# Patient Record
Sex: FEMALE | Race: WHITE | NOT HISPANIC OR LATINO | Employment: FULL TIME | URBAN - METROPOLITAN AREA
[De-identification: names, ages, dates, MRNs, and addresses within clinical notes are randomized per-mention and may not be internally consistent; named-entity substitution may affect disease eponyms.]

---

## 2017-01-11 ENCOUNTER — OFFICE VISIT (OUTPATIENT)
Dept: INTERNAL MEDICINE | Facility: CLINIC | Age: 32
End: 2017-01-11

## 2017-01-11 VITALS
HEIGHT: 68 IN | BODY MASS INDEX: 20.69 KG/M2 | HEART RATE: 92 BPM | SYSTOLIC BLOOD PRESSURE: 102 MMHG | TEMPERATURE: 98.6 F | WEIGHT: 136.5 LBS | OXYGEN SATURATION: 96 % | DIASTOLIC BLOOD PRESSURE: 52 MMHG

## 2017-01-11 DIAGNOSIS — J06.9 ACUTE URI: Primary | ICD-10-CM

## 2017-01-11 PROCEDURE — 99213 OFFICE O/P EST LOW 20 MIN: CPT | Performed by: FAMILY MEDICINE

## 2017-01-11 NOTE — PROGRESS NOTES
Subjective   Niki Nuñez is a 31 y.o. female.     Chief Complaint   Patient presents with   • patient had cold for couple day     getting over it, still feel aches   • Muscular Dystrophy       patient was diagnosed at [facility]          History of Present Illness patient has symptoms of upper respiratory infection with sore throat and head congestion scratchy throat which is gradually improving symptomatically treatment no specific fever  She also has been diagnosed with classic myotonic dystrophy type I and we discussed diagnoses previous symptoms and futureelevations of symptoms that may develop    The following portions of the patient's history were reviewed and updated as appropriate: allergies, current medications, past family history, past medical history, past social history, past surgical history and problem list.    Review of Systems   Constitutional: Negative.    Eyes: Negative.    Respiratory: Negative.    Gastrointestinal: Negative.    Neurological: Negative.    Psychiatric/Behavioral: Negative.        Objective   Physical Exam   Constitutional: She is oriented to person, place, and time. She appears well-developed and well-nourished.   Neurological: She is alert and oriented to person, place, and time.   Psychiatric: She has a normal mood and affect. Her behavior is normal. Judgment and thought content normal.   Nursing note and vitals reviewed.      Assessment/Plan   Niki was seen today for patient had cold for couple day and muscular dystrophy .    Diagnoses and all orders for this visit:    Acute URI    the extensive discussion regarding muscular dystrophy  Is going to follow-up with [facility]  Symptomatic treatment for URI follow-up as needed  47

## 2017-02-06 NOTE — MR AVS SNAPSHOT
Niki Nuñez   2017 10:30 AM   Office Visit    Dept Phone:  [phone]   Encounter #:  [id number]    Provider:  Andrey Mix MD   Department:  [facility] MED                Your Full Care Plan              Your Updated Medication List          This list is accurate as of: 17 12:53 PM.  Always use your most recent med list.                ADVIL 200 MG capsule   Generic drug:  Ibuprofen       CAMRESE LO 0.1-0.02 & 0.01 MG tablet   Generic drug:  Levonorgest-Eth Estrad 91-Day               Instructions     None    Patient Instructions History      Upcoming Appointments     Visit Type Date Time Department    ACUTE           2017 10:30 AM [facility] [place]      W[employer] Signup     [facility] [employer] allows you to send messages to your doctor, view your test results, renew your prescriptions, schedule appointments, and more. To sign up, go to MiniTime and click on the Sign Up Now link in the New User? box. Enter your [employer] Activation Code exactly as it appears below along with the last four digits of your Social Security Number and your Date of Birth () to complete the sign-up process. If you do not sign up before the expiration date, you must request a new code.    W[employer] Activation Code: [email or web address]  Expires: 2017 12:53 PM    If you have questions, you can email [email or web address] or call [phone] to talk to our [employer] staff. Remember, Wish [employer] Hero is NOT to be used for urgent needs. For medical emergencies, dial 911.               Other Info from Your Visit           Allergies     No Known Allergies      Reason for Visit     patient had cold for couple day getting over it, still feel aches    Muscular Dystrophy   patient was diagnosed at [facility]       Vital Signs     Blood Pressure Pulse Temperature Height Weight Oxygen Saturation    102/52 (BP Location: Right arm, Patient  Patient was last seen 7/14/16, no upcoming appt.  Script was eprescribed to pharmacy per Dr. Gutierrez for 5 months.        "Position: Sitting, Cuff Size: Adult) 92 98.6 °F (37 °C) (Oral) 68\" (172.7 cm) 136 lb 8 oz (61.9 kg) 96%    Breastfeeding? Body Mass Index Smoking Status             No 20.75 kg/m2 Never Smoker         Problems and Diagnoses Noted     Classic myotonic dystrophy type 1        "

## 2017-02-27 ENCOUNTER — TELEPHONE (OUTPATIENT)
Dept: INTERNAL MEDICINE | Facility: CLINIC | Age: 32
End: 2017-02-27

## 2017-02-27 DIAGNOSIS — G71.11 MYOTONIC DYSTROPHY, TYPE 1 (HCC): Primary | ICD-10-CM

## 2017-02-27 NOTE — TELEPHONE ENCOUNTER
Patient called stating that she needs a referral to Dr. Jose Eduardo Tamayo at [facility] for Myotonic Dystrophy Type 1.  Referral put in [facility].

## 2017-06-06 ENCOUNTER — OFFICE VISIT (OUTPATIENT)
Dept: CARDIOLOGY | Facility: CLINIC | Age: 32
End: 2017-06-06

## 2017-06-06 ENCOUNTER — TELEPHONE (OUTPATIENT)
Dept: CARDIOLOGY | Facility: CLINIC | Age: 32
End: 2017-06-06

## 2017-06-06 VITALS
SYSTOLIC BLOOD PRESSURE: 120 MMHG | HEART RATE: 78 BPM | DIASTOLIC BLOOD PRESSURE: 62 MMHG | WEIGHT: 141.6 LBS | HEIGHT: 68 IN | BODY MASS INDEX: 21.46 KG/M2

## 2017-06-06 DIAGNOSIS — G71.11: Primary | ICD-10-CM

## 2017-06-06 PROCEDURE — 93000 ELECTROCARDIOGRAM COMPLETE: CPT | Performed by: INTERNAL MEDICINE

## 2017-06-06 PROCEDURE — 99204 OFFICE O/P NEW MOD 45 MIN: CPT | Performed by: INTERNAL MEDICINE

## 2017-06-06 NOTE — TELEPHONE ENCOUNTER
Patient calling back with more information regarding her dad and his pacemaker.    Please call her at [phone].    Thanks!

## 2017-06-06 NOTE — PROGRESS NOTES
Date of Office Visit: 2017  Encounter Provider: Timothy Anderson MD  Place of Service: [facility] CARDIOLOGY  Patient Name: Niki Nuñez  :1985  [id number]    Chief Complaint   Patient presents with   • Shortness of Breath   • myotonic dystrophy   :     HPI: Niki Nuñez is a 31 y.o. female   She is here to evaluate her myotonic dystrophy type 1.  She was just diagnosed with this.  She has had it probably for a number of years and did not really realize it.  She just underwent genetic testing.  Her dad was diagnosed with it and she and several of her siblings have it.  He has a pacer defibrillator in but has never been shocked.  He has had atrial fibrillation.  She feels fine.  This is manifested mainly by  in her hands and a little bit of problem in her left foot if she gets on her toes.  She has not had syncope and no chest pain, shortness of breath, paroxysmal nocturnal dyspnea, orthopnea, or edema.  She may not have the energy that she used to have.  She does not smoke or have diabetes or hypertension.  She does not have any other real medical problems.  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          Past Medical History:   Diagnosis Date   • Cataracts, bilateral    • Classic myotonic dystrophy type 1 2016    [facility] Testing   • POLANCO (dyspnea on exertion)    • Gastrointestinal dysfunction    • Muscular dystrophy    • URI (upper respiratory infection)        Past Surgical History:   Procedure Laterality Date   • CATARACT EXTRACTION, BILATERAL     • CHOLECYSTECTOMY  2007   • LASIK Bilateral    • TEMPOROMANDIBULAR JOINT ARTHROPLASTY         Social History     Social History   • Marital status:      Spouse name: N/A   • Number of children: 0   • Years of education: N/A     Occupational History   • RN      Social History Main Topics   • Smoking status: Never Smoker   • Smokeless tobacco: Never Used   • Alcohol use Yes       Comment: 2-4 per week   • Drug use: Not on file   • Sexual activity: Yes     Partners: Male     Other Topics Concern   • Not on file     Social History Narrative       Family History   Problem Relation Age of Onset   • Nephrolithiasis Mother    • Glaucoma Father    • Autism Brother    • Breast cancer Maternal Grandmother    • Rheum arthritis Maternal Grandmother    • Hypertension Maternal Grandfather    • Diabetes Maternal Grandfather    • Macular degeneration Maternal Grandfather    • Heart attack Paternal Grandfather    • Heart disease Paternal Grandfather    • Glaucoma Paternal Grandfather    • Breast cancer Other    • Brain cancer Other    • Cancer Other    • Cancer Cousin    • Alcohol abuse Paternal Uncle    • Sleep apnea Other    • Other Other      myotonic dystrophy type 1       Review of Systems   Constitution: Negative for decreased appetite, fever, malaise/fatigue and weight loss.   HENT: Negative for nosebleeds.    Eyes: Negative for double vision.   Cardiovascular: Negative for chest pain, claudication, cyanosis, dyspnea on exertion, irregular heartbeat, leg swelling, near-syncope, orthopnea, palpitations, paroxysmal nocturnal dyspnea and syncope.   Respiratory: Negative for cough, hemoptysis and shortness of breath.    Hematologic/Lymphatic: Negative for bleeding problem.   Skin: Negative for rash.   Musculoskeletal: Negative for falls and myalgias.   Gastrointestinal: Negative for hematochezia, jaundice, melena, nausea and vomiting.   Genitourinary: Negative for hematuria.   Neurological: Negative for dizziness and seizures.   Psychiatric/Behavioral: Negative for altered mental status and memory loss.       No Known Allergies      Current Outpatient Prescriptions:   •  CAMRESE LO 0.1-0.02 & 0.01 MG tablet, Take 1 tablet by mouth daily., Disp: , Rfl: 0  •  Ibuprofen (ADVIL) 200 MG capsule, Take 2 tablets by mouth daily as needed., Disp: , Rfl:      Objective:     Vitals:    06/06/17 1438   BP: 120/62  "  Pulse: 78   Weight: 141 lb 9.6 oz (64.2 kg)   Height: 68\" (172.7 cm)     Body mass index is 21.53 kg/(m^2).    Physical Exam   Constitutional: She is oriented to person, place, and time. She appears well-developed and well-nourished.   HENT:   Head: Normocephalic.   Eyes: No scleral icterus.   Neck: No JVD present. No thyromegaly present.   Cardiovascular: Normal rate, regular rhythm and normal heart sounds.  Exam reveals no gallop and no friction rub.    No murmur heard.  Pulmonary/Chest: Effort normal and breath sounds normal. She has no wheezes. She has no rales.   Abdominal: Soft. There is no hepatosplenomegaly. There is no tenderness.   Musculoskeletal: Normal range of motion. She exhibits no edema.   Lymphadenopathy:     She has no cervical adenopathy.   Neurological: She is alert and oriented to person, place, and time.   Skin: Skin is warm and dry. No rash noted.   Psychiatric: She has a normal mood and affect.         ECG 12 Lead  Date/Time: 6/6/2017 3:24 PM  Performed by: PANKAJ LORD  Authorized by: PANKAJ LORD   Comparison: compared with previous ECG   Similar to previous ECG  Rhythm: sinus rhythm  Clinical impression: normal ECG             Assessment:       Diagnosis Plan   1. Classic myotonic dystrophy type 1  Adult Transthoracic Echo Complete          Plan:        She, I think, is doing well right now.   She has myotonic dystrophy, type 1, which the main morbidity and mortality with it is cardiac involvement with the development of conduction system disease and/or cardiomyopathy.   Her EKG is normal. Her exam is normal. She does not have any symptoms.   I think we should get and echo as a baseline and then we may consider a Holter also on her at some point.  I think she is probably stable right now.   I am just going to have her come back in and see me in a  year, sooner if she has trouble.           As always, it has been a pleasure to participate in your patient's care.      Sincerely, "       Timothy Anderson MD

## 2017-06-15 ENCOUNTER — HOSPITAL ENCOUNTER (OUTPATIENT)
Dept: CARDIOLOGY | Facility: HOSPITAL | Age: 32
Discharge: HOME | End: 2017-06-15
Attending: INTERNAL MEDICINE | Admitting: INTERNAL MEDICINE

## 2017-06-15 VITALS
SYSTOLIC BLOOD PRESSURE: 106 MMHG | HEIGHT: 68 IN | HEART RATE: 80 BPM | BODY MASS INDEX: 21.37 KG/M2 | DIASTOLIC BLOOD PRESSURE: 60 MMHG | WEIGHT: 141 LBS

## 2017-06-15 DIAGNOSIS — G71.11: ICD-10-CM

## 2017-06-15 LAB
BH CV ECHO MEAS - ACS: 1.9 CM
BH CV ECHO MEAS - AO MAX PG (FULL): 2.6 MMHG
BH CV ECHO MEAS - AO MAX PG: 4.8 MMHG
BH CV ECHO MEAS - AO MEAN PG (FULL): 1.3 MMHG
BH CV ECHO MEAS - AO MEAN PG: 2.8 MMHG
BH CV ECHO MEAS - AO ROOT AREA (BSA CORRECTED): 1.4
BH CV ECHO MEAS - AO ROOT AREA: 4.7 CM^2
BH CV ECHO MEAS - AO ROOT DIAM: 2.4 CM
BH CV ECHO MEAS - AO V2 MAX: 109.3 CM/SEC
BH CV ECHO MEAS - AO V2 MEAN: 79 CM/SEC
BH CV ECHO MEAS - AO V2 VTI: 18.9 CM
BH CV ECHO MEAS - AVA(I,A): 1.6 CM^2
BH CV ECHO MEAS - AVA(I,D): 1.6 CM^2
BH CV ECHO MEAS - AVA(V,A): 1.5 CM^2
BH CV ECHO MEAS - AVA(V,D): 1.5 CM^2
BH CV ECHO MEAS - BSA(HAYCOCK): 1.8 M^2
BH CV ECHO MEAS - BSA: 1.8 M^2
BH CV ECHO MEAS - BZI_BMI: 21.4 KILOGRAMS/M^2
BH CV ECHO MEAS - BZI_METRIC_HEIGHT: 172.7 CM
BH CV ECHO MEAS - BZI_METRIC_WEIGHT: 64 KG
BH CV ECHO MEAS - CONTRAST EF (2CH): 68.2 ML/M^2
BH CV ECHO MEAS - CONTRAST EF 4CH: 63.8 ML/M^2
BH CV ECHO MEAS - EDV(CUBED): 63.7 ML
BH CV ECHO MEAS - EDV(MOD-SP2): 66 ML
BH CV ECHO MEAS - EDV(MOD-SP4): 69 ML
BH CV ECHO MEAS - EDV(TEICH): 69.8 ML
BH CV ECHO MEAS - EF(CUBED): 74 %
BH CV ECHO MEAS - EF(MOD-SP2): 68.2 %
BH CV ECHO MEAS - EF(MOD-SP4): 63.8 %
BH CV ECHO MEAS - EF(TEICH): 66.4 %
BH CV ECHO MEAS - ESV(CUBED): 16.6 ML
BH CV ECHO MEAS - ESV(MOD-SP2): 21 ML
BH CV ECHO MEAS - ESV(MOD-SP4): 25 ML
BH CV ECHO MEAS - ESV(TEICH): 23.5 ML
BH CV ECHO MEAS - FS: 36.2 %
BH CV ECHO MEAS - IVS/LVPW: 1.2
BH CV ECHO MEAS - IVSD: 0.89 CM
BH CV ECHO MEAS - LAT PEAK E' VEL: 6 CM/SEC
BH CV ECHO MEAS - LV DIASTOLIC VOL/BSA (35-75): 39.2 ML/M^2
BH CV ECHO MEAS - LV MASS(C)D: 95.5 GRAMS
BH CV ECHO MEAS - LV MASS(C)DI: 54.2 GRAMS/M^2
BH CV ECHO MEAS - LV MAX PG: 2.1 MMHG
BH CV ECHO MEAS - LV MEAN PG: 1.5 MMHG
BH CV ECHO MEAS - LV SYSTOLIC VOL/BSA (12-30): 14.2 ML/M^2
BH CV ECHO MEAS - LV V1 MAX: 73.3 CM/SEC
BH CV ECHO MEAS - LV V1 MEAN: 59.1 CM/SEC
BH CV ECHO MEAS - LV V1 VTI: 13.1 CM
BH CV ECHO MEAS - LVIDD: 4 CM
BH CV ECHO MEAS - LVIDS: 2.6 CM
BH CV ECHO MEAS - LVLD AP2: 8.3 CM
BH CV ECHO MEAS - LVLD AP4: 7.9 CM
BH CV ECHO MEAS - LVLS AP2: 7.2 CM
BH CV ECHO MEAS - LVLS AP4: 6.9 CM
BH CV ECHO MEAS - LVOT AREA (M): 2.3 CM^2
BH CV ECHO MEAS - LVOT AREA: 2.3 CM^2
BH CV ECHO MEAS - LVOT DIAM: 1.7 CM
BH CV ECHO MEAS - LVPWD: 0.74 CM
BH CV ECHO MEAS - MED PEAK E' VEL: 7 CM/SEC
BH CV ECHO MEAS - MV A DUR: 0.08 SEC
BH CV ECHO MEAS - MV A MAX VEL: 38.6 CM/SEC
BH CV ECHO MEAS - MV DEC SLOPE: 252.2 CM/SEC^2
BH CV ECHO MEAS - MV DEC TIME: 0.24 SEC
BH CV ECHO MEAS - MV E MAX VEL: 61.3 CM/SEC
BH CV ECHO MEAS - MV E/A: 1.6
BH CV ECHO MEAS - MV MAX PG: 5.6 MMHG
BH CV ECHO MEAS - MV MEAN PG: 2.3 MMHG
BH CV ECHO MEAS - MV P1/2T MAX VEL: 61.3 CM/SEC
BH CV ECHO MEAS - MV P1/2T: 71.2 MSEC
BH CV ECHO MEAS - MV V2 MAX: 118.8 CM/SEC
BH CV ECHO MEAS - MV V2 MEAN: 70 CM/SEC
BH CV ECHO MEAS - MV V2 VTI: 24.2 CM
BH CV ECHO MEAS - MVA P1/2T LCG: 3.6 CM^2
BH CV ECHO MEAS - MVA(P1/2T): 3.1 CM^2
BH CV ECHO MEAS - MVA(VTI): 1.3 CM^2
BH CV ECHO MEAS - PA ACC TIME: 0.12 SEC
BH CV ECHO MEAS - PA MAX PG (FULL): 1.9 MMHG
BH CV ECHO MEAS - PA MAX PG: 4.6 MMHG
BH CV ECHO MEAS - PA PR(ACCEL): 23.5 MMHG
BH CV ECHO MEAS - PA V2 MAX: 107.2 CM/SEC
BH CV ECHO MEAS - PULM A REVS DUR: 0.09 SEC
BH CV ECHO MEAS - PULM A REVS VEL: 20.1 CM/SEC
BH CV ECHO MEAS - PULM DIAS VEL: 49.7 CM/SEC
BH CV ECHO MEAS - PULM S/D: 0.84
BH CV ECHO MEAS - PULM SYS VEL: 41.6 CM/SEC
BH CV ECHO MEAS - PVA(V,A): 1.5 CM^2
BH CV ECHO MEAS - PVA(V,D): 1.5 CM^2
BH CV ECHO MEAS - QP/QS: 1.1
BH CV ECHO MEAS - RAP SYSTOLE: 3 MMHG
BH CV ECHO MEAS - RV MAX PG: 2.7 MMHG
BH CV ECHO MEAS - RV MEAN PG: 1.5 MMHG
BH CV ECHO MEAS - RV V1 MAX: 82.5 CM/SEC
BH CV ECHO MEAS - RV V1 MEAN: 58.3 CM/SEC
BH CV ECHO MEAS - RV V1 VTI: 16.2 CM
BH CV ECHO MEAS - RVOT AREA: 2 CM^2
BH CV ECHO MEAS - RVOT DIAM: 1.6 CM
BH CV ECHO MEAS - SI(AO): 50 ML/M^2
BH CV ECHO MEAS - SI(CUBED): 26.8 ML/M^2
BH CV ECHO MEAS - SI(LVOT): 17.2 ML/M^2
BH CV ECHO MEAS - SI(MOD-SP2): 25.5 ML/M^2
BH CV ECHO MEAS - SI(MOD-SP4): 25 ML/M^2
BH CV ECHO MEAS - SI(TEICH): 26.3 ML/M^2
BH CV ECHO MEAS - SUP REN AO DIAM: 1.4 CM
BH CV ECHO MEAS - SV(AO): 88 ML
BH CV ECHO MEAS - SV(CUBED): 47.2 ML
BH CV ECHO MEAS - SV(LVOT): 30.3 ML
BH CV ECHO MEAS - SV(MOD-SP2): 45 ML
BH CV ECHO MEAS - SV(MOD-SP4): 44 ML
BH CV ECHO MEAS - SV(RVOT): 32.5 ML
BH CV ECHO MEAS - SV(TEICH): 46.3 ML
BH CV ECHO MEAS - TAPSE (>1.6): 2.5 CM2
BH CV XLRA - RV BASE: 2.2 CM
BH CV XLRA - TDI S': 15 CM/SEC
E/E' RATIO: 6.5
LEFT ATRIUM VOLUME INDEX: 11 ML/M2

## 2017-06-15 PROCEDURE — 93306 TTE W/DOPPLER COMPLETE: CPT

## 2017-06-15 PROCEDURE — 93306 TTE W/DOPPLER COMPLETE: CPT | Performed by: INTERNAL MEDICINE

## 2017-06-16 ENCOUNTER — TELEPHONE (OUTPATIENT)
Dept: CARDIOLOGY | Facility: CLINIC | Age: 32
End: 2017-06-16

## 2017-06-16 NOTE — TELEPHONE ENCOUNTER
----- Message from Timothy Anderson MD sent at 6/16/2017  2:23 PM EDT -----  i left message it is nl

## 2018-02-01 ENCOUNTER — TELEPHONE (OUTPATIENT)
Dept: INTERNAL MEDICINE | Facility: CLINIC | Age: 33
End: 2018-02-01

## 2018-02-02 ENCOUNTER — OFFICE VISIT (OUTPATIENT)
Dept: INTERNAL MEDICINE | Facility: CLINIC | Age: 33
End: 2018-02-02

## 2018-02-02 VITALS
BODY MASS INDEX: 22.35 KG/M2 | WEIGHT: 147.5 LBS | TEMPERATURE: 96.8 F | SYSTOLIC BLOOD PRESSURE: 100 MMHG | DIASTOLIC BLOOD PRESSURE: 74 MMHG | HEIGHT: 68 IN | OXYGEN SATURATION: 98 % | HEART RATE: 96 BPM

## 2018-02-02 DIAGNOSIS — T82.898S: Primary | ICD-10-CM

## 2018-02-02 PROCEDURE — 99213 OFFICE O/P EST LOW 20 MIN: CPT | Performed by: FAMILY MEDICINE

## 2018-02-02 RX ORDER — DIPHENHYDRAMINE HCL 25 MG
25 CAPSULE ORAL NIGHTLY PRN
COMMUNITY

## 2018-02-02 RX ORDER — POLYETHYLENE GLYCOL-3350 AND ELECTROLYTES 236; 6.74; 5.86; 2.97; 22.74 G/274.31G; G/274.31G; G/274.31G; G/274.31G; G/274.31G
POWDER, FOR SOLUTION ORAL
Refills: 0 | COMMUNITY
Start: 2017-12-14 | End: 2018-02-02

## 2018-02-02 RX ORDER — NORGESTREL-ETHINYL ESTRADIOL 0.3-0.03MG
TABLET ORAL
Refills: 0 | COMMUNITY
Start: 2017-12-07

## 2018-02-14 PROBLEM — T82.898A: Status: ACTIVE | Noted: 2018-02-14

## 2018-02-14 NOTE — PROGRESS NOTES
"Niki Nuñez is a 32 y.o. female.      Assessment/Plan   Problem List Items Addressed This Visit        Other    Hematoma of IV site - Primary           Jarrod for gradual slow improvement no specific treatment at this time      Chief Complaint   Patient presents with   • had an IV in left arm and now having a hard spot and painful     done on 1/18/18     Social History   Substance Use Topics   • Smoking status: Never Smoker   • Smokeless tobacco: Never Used   • Alcohol use Yes      Comment: 2-4 per week       History of Present Illness     The following pPatient had IV in her arm now subsequently has developed a firm nodule that is not specifically tender or red ortions of the patient's history were reviewed and updated as appropriate:PMHroutine: Social history , Allergies, Current Medications, Active Problem List, Family History and Health Maintenance    Review of Systems   Constitutional: Negative.    HENT: Negative.    Hematological: Negative.        Objective   Vitals:    02/02/18 1043   BP: 100/74   BP Location: Right arm   Patient Position: Sitting   Cuff Size: Adult   Pulse: 96   Temp: 96.8 °F (36 °C)   TempSrc: Oral   SpO2: 98%   Weight: 66.9 kg (147 lb 8 oz)   Height: 172.7 cm (68\")     Body mass index is 22.43 kg/(m^2).  Physical Exam   Constitutional: She appears well-developed and well-nourished.   HENT:   Head: Normocephalic and atraumatic.   Cardiovascular: Normal rate and regular rhythm.    Psychiatric: She has a normal mood and affect. Her behavior is normal. Judgment and thought content normal.   Nursing note and vitals reviewed.   left arm nodule extending the antecubital area no erythema and no specific tenderness not still compromise of cardiovascular supply proximally no lymph nodes  Reviewed Data:  No visits with results within 1 Month(s) from this visit.  Latest known visit with results is:    Hospital Outpatient Visit on 06/15/2017   Component Date Value Ref Range Status   • BSA " 06/15/2017 1.8  m^2 Preliminary   • IVSd 06/15/2017 0.89  cm Preliminary   • LVIDd 06/15/2017 4.0  cm Preliminary   • LVIDs 06/15/2017 2.6  cm Preliminary   • LVPWd 06/15/2017 0.74  cm Preliminary   • IVS/LVPW 06/15/2017 1.2   Preliminary   • FS 06/15/2017 36.2  % Preliminary   • EDV(Teich) 06/15/2017 69.8  ml Preliminary   • ESV(Teich) 06/15/2017 23.5  ml Preliminary   • EF(Teich) 06/15/2017 66.4  % Preliminary   • EDV(cubed) 06/15/2017 63.7  ml Preliminary   • ESV(cubed) 06/15/2017 16.6  ml Preliminary   • EF(cubed) 06/15/2017 74.0  % Preliminary   • LV mass(C)d 06/15/2017 95.5  grams Preliminary   • LV mass(C)dI 06/15/2017 54.2  grams/m^2 Preliminary   • SV(Teich) 06/15/2017 46.3  ml Preliminary   • SI(Teich) 06/15/2017 26.3  ml/m^2 Preliminary   • SV(cubed) 06/15/2017 47.2  ml Preliminary   • SI(cubed) 06/15/2017 26.8  ml/m^2 Preliminary   • Ao root diam 06/15/2017 2.4  cm Preliminary   • Ao root area 06/15/2017 4.7  cm^2 Preliminary   • ACS 06/15/2017 1.9  cm Preliminary   • LVOT diam 06/15/2017 1.7  cm Preliminary   • LVOT area 06/15/2017 2.3  cm^2 Preliminary   • LVOT area(traced) 06/15/2017 2.3  cm^2 Preliminary   • RVOT diam 06/15/2017 1.6  cm Preliminary   • RVOT area 06/15/2017 2.0  cm^2 Preliminary   • LVLd ap4 06/15/2017 7.9  cm Preliminary   • EDV(MOD-sp4) 06/15/2017 69.0  ml Preliminary   • LVLs ap4 06/15/2017 6.9  cm Preliminary   • ESV(MOD-sp4) 06/15/2017 25.0  ml Preliminary   • EF(MOD-sp4) 06/15/2017 63.8  % Preliminary   • LVLd ap2 06/15/2017 8.3  cm Preliminary   • EDV(MOD-sp2) 06/15/2017 66.0  ml Preliminary   • LVLs ap2 06/15/2017 7.2  cm Preliminary   • ESV(MOD-sp2) 06/15/2017 21.0  ml Preliminary   • EF(MOD-sp2) 06/15/2017 68.2  % Preliminary   • SV(MOD-sp4) 06/15/2017 44.0  ml Preliminary   • SI(MOD-sp4) 06/15/2017 25.0  ml/m^2 Preliminary   • SV(MOD-sp2) 06/15/2017 45.0  ml Preliminary   • SI(MOD-sp2) 06/15/2017 25.5  ml/m^2 Preliminary   • Ao root area (BSA corrected) 06/15/2017 1.4    Preliminary   • Ao root area (BSA corrected) 06/15/2017 68.2  ml/m^2 Preliminary   • CONTRAST EF 4CH 06/15/2017 63.8  ml/m^2 Preliminary   • LV Diastolic corrected for BSA 06/15/2017 39.2  ml/m^2 Preliminary   • LV Systolic corrected for BSA 06/15/2017 14.2  ml/m^2 Preliminary   • MV A dur 06/15/2017 0.08  sec Preliminary   • MV E max chinyere 06/15/2017 61.3  cm/sec Preliminary   • MV A max chinyere 06/15/2017 38.6  cm/sec Preliminary   • MV E/A 06/15/2017 1.6   Preliminary   • MV V2 max 06/15/2017 118.8  cm/sec Preliminary   • MV max PG 06/15/2017 5.6  mmHg Preliminary   • MV V2 mean 06/15/2017 70.0  cm/sec Preliminary   • MV mean PG 06/15/2017 2.3  mmHg Preliminary   • MV V2 VTI 06/15/2017 24.2  cm Preliminary   • MVA(VTI) 06/15/2017 1.3  cm^2 Preliminary   • MV P1/2t max chinyere 06/15/2017 61.3  cm/sec Preliminary   • MV P1/2t 06/15/2017 71.2  msec Preliminary   • MVA(P1/2t) 06/15/2017 3.1  cm^2 Preliminary   • MV dec slope 06/15/2017 252.2  cm/sec^2 Preliminary   • MV dec time 06/15/2017 0.24  sec Preliminary   • Ao pk chinyere 06/15/2017 109.3  cm/sec Preliminary   • Ao max PG 06/15/2017 4.8  mmHg Preliminary   • Ao max PG (full) 06/15/2017 2.6  mmHg Preliminary   • Ao V2 mean 06/15/2017 79.0  cm/sec Preliminary   • Ao mean PG 06/15/2017 2.8  mmHg Preliminary   • Ao mean PG (full) 06/15/2017 1.3  mmHg Preliminary   • Ao V2 VTI 06/15/2017 18.9  cm Preliminary   • YAKOV(I,A) 06/15/2017 1.6  cm^2 Preliminary   • YAKOV(I,D) 06/15/2017 1.6  cm^2 Preliminary   • YAKOV(V,A) 06/15/2017 1.5  cm^2 Preliminary   • YAKOV(V,D) 06/15/2017 1.5  cm^2 Preliminary   • LV V1 max PG 06/15/2017 2.1  mmHg Preliminary   • LV V1 mean PG 06/15/2017 1.5  mmHg Preliminary   • LV V1 max 06/15/2017 73.3  cm/sec Preliminary   • LV V1 mean 06/15/2017 59.1  cm/sec Preliminary   • LV V1 VTI 06/15/2017 13.1  cm Preliminary   • SV(Ao) 06/15/2017 88.0  ml Preliminary   • SI(Ao) 06/15/2017 50.0  ml/m^2 Preliminary   • SV(LVOT) 06/15/2017 30.3  ml Preliminary   • SV(RVOT)  06/15/2017 32.5  ml Preliminary   • SI(LVOT) 06/15/2017 17.2  ml/m^2 Preliminary   • PA V2 max 06/15/2017 107.2  cm/sec Preliminary   • PA max PG 06/15/2017 4.6  mmHg Preliminary   • PA max PG (full) 06/15/2017 1.9  mmHg Preliminary   • BH CV ECHO SUZETTE - PVA(V,A) 06/15/2017 1.5  cm^2 Preliminary   • BH CV ECHO SUZETTE - PVA(V,D) 06/15/2017 1.5  cm^2 Preliminary   • PA acc time 06/15/2017 0.12  sec Preliminary   • RV V1 max PG 06/15/2017 2.7  mmHg Preliminary   • RV V1 mean PG 06/15/2017 1.5  mmHg Preliminary   • RV V1 max 06/15/2017 82.5  cm/sec Preliminary   • RV V1 mean 06/15/2017 58.3  cm/sec Preliminary   • RV V1 VTI 06/15/2017 16.2  cm Preliminary   • RAP systole 06/15/2017 3.0  mmHg Preliminary   • PA pr(Accel) 06/15/2017 23.5  mmHg Preliminary   • Pulm Sys Maximiliano 06/15/2017 41.6  cm/sec Preliminary   • Pulm Mcneil Maximiliano 06/15/2017 49.7  cm/sec Preliminary   • Pulm S/D 06/15/2017 0.84   Preliminary   • Qp/Qs 06/15/2017 1.1   Preliminary   • Pulm A Revs Dur 06/15/2017 0.09  sec Preliminary   • Pulm A Revs Maximiliano 06/15/2017 20.1  cm/sec Preliminary   • MVA P1/2T LCG 06/15/2017 3.6  cm^2 Preliminary   • BH CV ECHO SUZETTE - BZI_BMI 06/15/2017 21.4  kilograms/m^2 Preliminary   • BH CV ECHO SUZETTE - BSA(HAYCOCK) 06/15/2017 1.8  m^2 Preliminary   • BH CV ECHO SUZETTE - BZI_METRIC_WEIGHT 06/15/2017 64.0  kg Preliminary   • BH CV ECHO SUZETTE - BZI_METRIC_HEIGHT 06/15/2017 172.7  cm Preliminary   • TDI S' 06/15/2017 15.00  cm/sec Final   • RV Base 06/15/2017 2.20  cm Final   • E/E' ratio 06/15/2017 6.5   Final   • LA Volume Index 06/15/2017 11.0  mL/m2 Final   • Lat Peak E' Maximiliano 06/15/2017 6.0  cm/sec Final   • Med Peak E' Maximiliano 06/15/2017 7.00  cm/sec Final   • Abdo Ao Diam 06/15/2017 1.40  cm Final   • TAPSE (>1.6) 06/15/2017 2.50  cm2 Final

## 2019-02-11 ENCOUNTER — OFFICE VISIT (OUTPATIENT)
Dept: INTERNAL MEDICINE | Facility: CLINIC | Age: 34
End: 2019-02-11

## 2019-02-11 VITALS
RESPIRATION RATE: 18 BRPM | HEIGHT: 69 IN | TEMPERATURE: 98.5 F | WEIGHT: 149.1 LBS | HEART RATE: 89 BPM | DIASTOLIC BLOOD PRESSURE: 62 MMHG | BODY MASS INDEX: 22.08 KG/M2 | OXYGEN SATURATION: 95 % | SYSTOLIC BLOOD PRESSURE: 104 MMHG

## 2019-02-11 DIAGNOSIS — J02.9 ACUTE PHARYNGITIS, UNSPECIFIED ETIOLOGY: Primary | ICD-10-CM

## 2019-02-11 PROCEDURE — 99213 OFFICE O/P EST LOW 20 MIN: CPT | Performed by: FAMILY MEDICINE

## 2019-02-11 RX ORDER — NITROFURANTOIN 25; 75 MG/1; MG/1
CAPSULE ORAL
Refills: 0 | COMMUNITY
Start: 2018-11-17 | End: 2019-02-11

## 2019-02-11 NOTE — PROGRESS NOTES
Niki Nuñez is a 33 y.o. female.      Assessment/Plan   Problem List Items Addressed This Visit     None      Visit Diagnoses     Acute pharyngitis, unspecified etiology    -  Primary         Continue symptomatic treatment of URI if worsening cough development of fever follow-up appointment otherwise as needed        Chief Complaint   Patient presents with   • Sore Throat     x 3 days, went to  dx with viral      Social History     Tobacco Use   • Smoking status: Never Smoker   • Smokeless tobacco: Never Used   Substance Use Topics   • Alcohol use: Yes     Comment: 2-4 per week   • Drug use: Not on file       History of Present Illness   Patient has had persistent sore throat for the last 3 days no fever although had some chills felt warm no lymph nodes felt in her neck has developed cough that is nonproductive minimal improvement with over-the-counter remedies unknown exposure to strep  The following portions of the patient's history were reviewed and updated as appropriate:PMHroutine: Social history , Past Medical History, Allergies, Current Medications and Active Problem List    Review of Systems   Constitutional: Negative for activity change, fatigue and unexpected weight change.   HENT: Positive for congestion, postnasal drip and sore throat. Negative for ear pain.    Eyes: Negative for pain and discharge.   Respiratory: Positive for cough. Negative for chest tightness, shortness of breath and wheezing.    Cardiovascular: Negative for chest pain and palpitations.   Gastrointestinal: Negative for abdominal pain, diarrhea and vomiting.   Endocrine: Negative.    Genitourinary: Negative.    Musculoskeletal: Negative for joint swelling.   Skin: Negative for color change, rash and wound.   Allergic/Immunologic: Negative.    Neurological: Negative for seizures and syncope.   Psychiatric/Behavioral: Negative.        Objective   Vitals:    02/11/19 1450   BP: 104/62   BP Location: Right arm   Patient Position:  "Sitting   Cuff Size: Adult   Pulse: 89   Resp: 18   Temp: 98.5 °F (36.9 °C)   TempSrc: Oral   SpO2: 95%   Weight: 67.6 kg (149 lb 1.6 oz)   Height: 175.3 cm (69\")     Body mass index is 22.02 kg/m².  Physical Exam   Constitutional: She is oriented to person, place, and time. She appears well-developed and well-nourished.  Non-toxic appearance. No distress.   HENT:   Head: Normocephalic and atraumatic. Hair is normal.   Right Ear: External ear normal. No drainage, swelling or tenderness. Tympanic membrane is retracted.   Left Ear: External ear normal. No drainage, swelling or tenderness. Tympanic membrane is retracted.   Nose: Mucosal edema present. No epistaxis.   Mouth/Throat: Uvula is midline and mucous membranes are normal. No oral lesions. No uvula swelling. Posterior oropharyngeal erythema present. No oropharyngeal exudate.   Eyes: Conjunctivae and EOM are normal. Pupils are equal, round, and reactive to light. Right eye exhibits no discharge. Left eye exhibits no discharge. No scleral icterus.   Neck: Normal range of motion. Neck supple.   Cardiovascular: Normal rate, regular rhythm and normal heart sounds. Exam reveals no gallop.   No murmur heard.  Pulmonary/Chest: Breath sounds normal. No stridor. No respiratory distress. She has no wheezes. She has no rales. She exhibits no tenderness.   Abdominal: There is no tenderness.   Lymphadenopathy:     She has no cervical adenopathy.   Neurological: She is alert and oriented to person, place, and time.   Skin: Skin is warm and dry. No rash noted. She is not diaphoretic.   Psychiatric: She has a normal mood and affect. Her behavior is normal. Judgment and thought content normal.   Nursing note and vitals reviewed.    Reviewed Data:  No visits with results within 1 Month(s) from this visit.   Latest known visit with results is:   Hospital Outpatient Visit on 06/15/2017   Component Date Value Ref Range Status   • BSA 06/15/2017 1.8  m^2 Preliminary   • IVSd " 06/15/2017 0.89  cm Preliminary   • LVIDd 06/15/2017 4.0  cm Preliminary   • LVIDs 06/15/2017 2.6  cm Preliminary   • LVPWd 06/15/2017 0.74  cm Preliminary   • IVS/LVPW 06/15/2017 1.2   Preliminary   • FS 06/15/2017 36.2  % Preliminary   • EDV(Teich) 06/15/2017 69.8  ml Preliminary   • ESV(Teich) 06/15/2017 23.5  ml Preliminary   • EF(Teich) 06/15/2017 66.4  % Preliminary   • EDV(cubed) 06/15/2017 63.7  ml Preliminary   • ESV(cubed) 06/15/2017 16.6  ml Preliminary   • EF(cubed) 06/15/2017 74.0  % Preliminary   • LV mass(C)d 06/15/2017 95.5  grams Preliminary   • LV mass(C)dI 06/15/2017 54.2  grams/m^2 Preliminary   • SV(Teich) 06/15/2017 46.3  ml Preliminary   • SI(Teich) 06/15/2017 26.3  ml/m^2 Preliminary   • SV(cubed) 06/15/2017 47.2  ml Preliminary   • SI(cubed) 06/15/2017 26.8  ml/m^2 Preliminary   • Ao root diam 06/15/2017 2.4  cm Preliminary   • Ao root area 06/15/2017 4.7  cm^2 Preliminary   • ACS 06/15/2017 1.9  cm Preliminary   • LVOT diam 06/15/2017 1.7  cm Preliminary   • LVOT area 06/15/2017 2.3  cm^2 Preliminary   • LVOT area(traced) 06/15/2017 2.3  cm^2 Preliminary   • RVOT diam 06/15/2017 1.6  cm Preliminary   • RVOT area 06/15/2017 2.0  cm^2 Preliminary   • LVLd ap4 06/15/2017 7.9  cm Preliminary   • EDV(MOD-sp4) 06/15/2017 69.0  ml Preliminary   • LVLs ap4 06/15/2017 6.9  cm Preliminary   • ESV(MOD-sp4) 06/15/2017 25.0  ml Preliminary   • EF(MOD-sp4) 06/15/2017 63.8  % Preliminary   • LVLd ap2 06/15/2017 8.3  cm Preliminary   • EDV(MOD-sp2) 06/15/2017 66.0  ml Preliminary   • LVLs ap2 06/15/2017 7.2  cm Preliminary   • ESV(MOD-sp2) 06/15/2017 21.0  ml Preliminary   • EF(MOD-sp2) 06/15/2017 68.2  % Preliminary   • SV(MOD-sp4) 06/15/2017 44.0  ml Preliminary   • SI(MOD-sp4) 06/15/2017 25.0  ml/m^2 Preliminary   • SV(MOD-sp2) 06/15/2017 45.0  ml Preliminary   • SI(MOD-sp2) 06/15/2017 25.5  ml/m^2 Preliminary   • Ao root area (BSA corrected) 06/15/2017 1.4   Preliminary   • EF - Contrast (2Ch)  06/15/2017 68.2  ml/m^2 Preliminary   • EF - Contrast (4Ch) 06/15/2017 63.8  ml/m^2 Preliminary   • LV Mcneil Vol (BSA corrected) 06/15/2017 39.2  ml/m^2 Preliminary   • LV Sys Vol (BSA corrected) 06/15/2017 14.2  ml/m^2 Preliminary   • MV A dur 06/15/2017 0.08  sec Preliminary   • MV E max chinyere 06/15/2017 61.3  cm/sec Preliminary   • MV A max chinyere 06/15/2017 38.6  cm/sec Preliminary   • MV E/A 06/15/2017 1.6   Preliminary   • MV V2 max 06/15/2017 118.8  cm/sec Preliminary   • MV max PG 06/15/2017 5.6  mmHg Preliminary   • MV V2 mean 06/15/2017 70.0  cm/sec Preliminary   • MV mean PG 06/15/2017 2.3  mmHg Preliminary   • MV V2 VTI 06/15/2017 24.2  cm Preliminary   • MVA(VTI) 06/15/2017 1.3  cm^2 Preliminary   • MV P1/2t max chinyere 06/15/2017 61.3  cm/sec Preliminary   • MV P1/2t 06/15/2017 71.2  msec Preliminary   • MVA(P1/2t) 06/15/2017 3.1  cm^2 Preliminary   • MV dec slope 06/15/2017 252.2  cm/sec^2 Preliminary   • MV dec time 06/15/2017 0.24  sec Preliminary   • Ao pk chinyere 06/15/2017 109.3  cm/sec Preliminary   • Ao max PG 06/15/2017 4.8  mmHg Preliminary   • Ao max PG (full) 06/15/2017 2.6  mmHg Preliminary   • Ao V2 mean 06/15/2017 79.0  cm/sec Preliminary   • Ao mean PG 06/15/2017 2.8  mmHg Preliminary   • Ao mean PG (full) 06/15/2017 1.3  mmHg Preliminary   • Ao V2 VTI 06/15/2017 18.9  cm Preliminary   • YAKOV(I,A) 06/15/2017 1.6  cm^2 Preliminary   • YAKOV(I,D) 06/15/2017 1.6  cm^2 Preliminary   • YAKOV(V,A) 06/15/2017 1.5  cm^2 Preliminary   • YAKOV(V,D) 06/15/2017 1.5  cm^2 Preliminary   • LV V1 max PG 06/15/2017 2.1  mmHg Preliminary   • LV V1 mean PG 06/15/2017 1.5  mmHg Preliminary   • LV V1 max 06/15/2017 73.3  cm/sec Preliminary   • LV V1 mean 06/15/2017 59.1  cm/sec Preliminary   • LV V1 VTI 06/15/2017 13.1  cm Preliminary   • SV(Ao) 06/15/2017 88.0  ml Preliminary   • SI(Ao) 06/15/2017 50.0  ml/m^2 Preliminary   • SV(LVOT) 06/15/2017 30.3  ml Preliminary   • SV(RVOT) 06/15/2017 32.5  ml Preliminary   • SI(LVOT)  06/15/2017 17.2  ml/m^2 Preliminary   • PA V2 max 06/15/2017 107.2  cm/sec Preliminary   • PA max PG 06/15/2017 4.6  mmHg Preliminary   • PA max PG (full) 06/15/2017 1.9  mmHg Preliminary   • BH CV ECHO SUZETTE - PVA(V,A) 06/15/2017 1.5  cm^2 Preliminary   • BH CV ECHO SUZETTE - PVA(V,D) 06/15/2017 1.5  cm^2 Preliminary   • PA acc time 06/15/2017 0.12  sec Preliminary   • RV V1 max PG 06/15/2017 2.7  mmHg Preliminary   • RV V1 mean PG 06/15/2017 1.5  mmHg Preliminary   • RV V1 max 06/15/2017 82.5  cm/sec Preliminary   • RV V1 mean 06/15/2017 58.3  cm/sec Preliminary   • RV V1 VTI 06/15/2017 16.2  cm Preliminary   • RAP systole 06/15/2017 3.0  mmHg Preliminary   • PA pr(Accel) 06/15/2017 23.5  mmHg Preliminary   • Pulm Sys Maximiliano 06/15/2017 41.6  cm/sec Preliminary   • Pulm Mcneil Maximiliano 06/15/2017 49.7  cm/sec Preliminary   • Pulm S/D 06/15/2017 0.84   Preliminary   • Qp/Qs 06/15/2017 1.1   Preliminary   • Pulm A Revs Dur 06/15/2017 0.09  sec Preliminary   • Pulm A Revs Maximiliano 06/15/2017 20.1  cm/sec Preliminary   • MVA P1/2T LCG 06/15/2017 3.6  cm^2 Preliminary   • BH CV ECHO SUZETTE - BZI_BMI 06/15/2017 21.4  kilograms/m^2 Preliminary   • BH CV ECHO SUZETTE - BSA(HAYCOCK) 06/15/2017 1.8  m^2 Preliminary   • BH CV ECHO SUZETTE - BZI_METRIC_WEIGHT 06/15/2017 64.0  kg Preliminary   • BH CV ECHO SUZETTE - BZI_METRIC_HEIGHT 06/15/2017 172.7  cm Preliminary   • TDI S' 06/15/2017 15.00  cm/sec Final   • RV Base 06/15/2017 2.20  cm Final   • E/E' ratio 06/15/2017 6.5   Final   • LA Volume Index 06/15/2017 11.0  mL/m2 Final   • Lat Peak E' Maximiliano 06/15/2017 6.0  cm/sec Final   • Med Peak E' Maximiliano 06/15/2017 7.00  cm/sec Final   • Abdo Ao Diam 06/15/2017 1.40  cm Final   • TAPSE (>1.6) 06/15/2017 2.50  cm2 Final

## 2019-12-10 ENCOUNTER — TELEPHONE (OUTPATIENT)
Dept: ORTHOPEDIC SURGERY | Facility: CLINIC | Age: 34
End: 2019-12-10

## 2019-12-10 NOTE — TELEPHONE ENCOUNTER
I can see at 240 on Wednesday but patient needs to be prepared to wait and to bring copies of any x-rays that were made as I do not have access to them

## 2019-12-10 NOTE — TELEPHONE ENCOUNTER
I meant to say 2:40 on thjoanna, sorry, hope this still works, If not I can still see, but let me know,thanks

## 2019-12-10 NOTE — TELEPHONE ENCOUNTER
Ok to schedule IOV / 2nd OPINION / LEFT FOOT possible FX / DOI 11/30/19 / XR 12/01/19 (EPIC) to see MWM this Thurs 12/12/19 @ 2:10 (would give MWM 3 NEW back to back) or see CIM Wed 12/11/19? Thanks /srh

## 2019-12-10 NOTE — TELEPHONE ENCOUNTER
Noted. Patient is scheduled for tomorrow Wed 12/11/19 @ 11:50 (coming @ 1440 / 2:40 ok per MWM) & patient is picking up XR disc from [facility] now. Thanks / srh

## 2019-12-10 NOTE — TELEPHONE ENCOUNTER
FYI: Noted. Thanks. Patient is going to attempt to get XR disc from [facility] 1st before possible appointment with MWM tomorrow Wed 12/11/19.     Patient to call Esha H / SHANI back if / when can get XR discs to be scheduled with MWM Wed 12/11/19 at 2:40 for IOV / 2nd OPINION / LEFT FOOT possible FX / DOI 11/30/19 / XR 12/01/19 (EPIC) / ok per MW. Thanks again /srh

## 2019-12-11 ENCOUNTER — OFFICE VISIT (OUTPATIENT)
Dept: ORTHOPEDIC SURGERY | Facility: CLINIC | Age: 34
End: 2019-12-11

## 2019-12-11 VITALS — TEMPERATURE: 98.3 F | HEIGHT: 69 IN | BODY MASS INDEX: 20.73 KG/M2 | WEIGHT: 140 LBS

## 2019-12-11 DIAGNOSIS — S99.922A FOOT INJURY, LEFT, INITIAL ENCOUNTER: ICD-10-CM

## 2019-12-11 DIAGNOSIS — S99.919A ANKLE INJURY, INITIAL ENCOUNTER: ICD-10-CM

## 2019-12-11 DIAGNOSIS — S86.302A INJURY OF PERONEAL TENDON OF LEFT FOOT, INITIAL ENCOUNTER: ICD-10-CM

## 2019-12-11 DIAGNOSIS — S92.215A CLOSED NONDISPLACED FRACTURE OF CUBOID OF LEFT FOOT, INITIAL ENCOUNTER: Primary | ICD-10-CM

## 2019-12-11 PROCEDURE — 73630 X-RAY EXAM OF FOOT: CPT | Performed by: ORTHOPAEDIC SURGERY

## 2019-12-11 PROCEDURE — 99204 OFFICE O/P NEW MOD 45 MIN: CPT | Performed by: ORTHOPAEDIC SURGERY

## 2019-12-11 PROCEDURE — 73600 X-RAY EXAM OF ANKLE: CPT | Performed by: ORTHOPAEDIC SURGERY

## 2019-12-11 RX ORDER — TRAMADOL HYDROCHLORIDE 50 MG/1
TABLET ORAL
COMMUNITY
Start: 2019-12-10 | End: 2020-03-11

## 2019-12-11 NOTE — PROGRESS NOTES
New Patient Complaint      Patient: Niki Nuñez  YOB: 1985 33 y.o. female  Medical Record Number: [id number]    Chief Complaints: I hurt my foot and ankle    History of Present Illness:     Patient sustained an injury to her left foot and ankle when she was walking her dog on some train tracks on 11/30/2019 and fell.  She is unsure of what the mechanism was.    Due to inability to bear weight she was seen in urgent care the following day and prescribed a boot and told she may have a cuboid fracture.    She saw a podiatrist subsequent to that whom her  had seen previously her told her she could walk on it and had persistent pain and difficulty bearing weight she was working here today for further evaluation reports mild to moderate stabbing aching pain over the inferolateral aspect of the left hindfoot and midfoot with associated bruising and swelling worse with standing and walking improved with ice and rest.    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   • hydrochlorothiazide (HYDRODIURIL) 25 MG tablet    • Ibuprofen (ADVIL) 200 MG capsule Take 2 tablets by mouth daily as needed.   • traMADol (ULTRAM) 50 MG tablet      No current facility-administered medications on file prior to visit.        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    [facility]   • POLANCO (dyspnea on exertion)    • Gastrointestinal dysfunction    • Muscular dystrophy (CMS/[facility])    • URI (upper respiratory infection)      Past Surgical History:   Procedure Laterality Date   • CATARACT EXTRACTION, BILATERAL     • CHOLECYSTECTOMY  07/2007   • LASIK Bilateral    • TEMPOROMANDIBULAR JOINT ARTHROPLASTY  2004     Social History     Occupational History   • Occupation: RN   Tobacco Use   • Smoking status: Never Smoker   • Smokeless tobacco: Never Used   Substance and Sexual Activity   • Alcohol use: Yes     Comment: 2-4 per week   • Drug use: Not on file   • Sexual activity: Yes     Partners: Male      Social History     Social History Narrative   • Not on file     Family History   Problem Relation Age of Onset   • Nephrolithiasis Mother    • Glaucoma Father    • Autism Brother    • Breast cancer Maternal Grandmother    • Rheum arthritis Maternal Grandmother    • Hypertension Maternal Grandfather    • Diabetes Maternal Grandfather    • Macular degeneration Maternal Grandfather    • Heart attack Paternal Grandfather    • Heart disease Paternal Grandfather    • Glaucoma Paternal Grandfather    • Breast cancer Other    • Brain cancer Other    • Cancer Other    • Cancer Cousin    • Alcohol abuse Paternal Uncle    • Sleep apnea Other    • Other Other         myotonic dystrophy type 1       Review of Systems: 14 point review of systems performed, positive pertinent findings identified in HPI. All remaining systems negative except constipation, diarrhea, nausea    Review of Systems      Physical Exam:   Vitals:    12/11/19 1450   Temp: 98.3 °F (36.8 °C)   Weight: 63.5 kg (140 lb)   Height: 175.3 cm (69\")     Physical Exam   Constitutional: pleasant, well developed She is with her   Eyes: sclera non icteric  Hearing : adequate for exam  Cardiovascular: palpable pulses in left foot, left calf/ thigh NT without sign of DVT  Respiratoy: breathing unlabored "   Neurological: grossly sensate to LT throughout left LE  Psychiatric: oriented with normal mood and affect.   Lymphatic: No palpable popliteal lymphadenopathy left LE  Skin: intact throughout left leg/foot  Musculoskeletal: She has what appears to be a mild cavovarus foot.  There is moderate discomfort to palpation in the inferolateral aspect of the hindfoot along peroneal tendons and dorsal lateral midfoot along the cuboid.  There is some bruising into the arch with mild discomfort to palpation into the forefoot but no focal tenderness over the dorsum of the midfoot or into the metatarsals dorsally.  Nontender over the medial ankle.  There was mild tenderness over the anterolateral ligamentous structures  Physical Exam  Ortho Exam    Radiology: 3 views left foot and 2 views left ankle ordered evaluate pain and alignment reviewed and compared with a lateral view of the left foot the patient brought in and with x-ray report that was reviewed from 12/1/2019.  There appears to be an os peroneum as well as a linear area on the lateral aspect of the cuboid that may be a nondisplaced fracture.  No fracture or malalignment to the midfoot.    Assessment/Plan: 1.  Left foot injury with possible cuboid fracture  2.  Left ankle injury with possible peroneal tendon tear and anterolateral ligamentous sprain without evidence of medial injury.    Reviewed treatment options and nothing I would recommend from a surgical standpoint at this time and will use more information and evaluate her peroneal tendons and evaluate for possible cuboid fracture.    She may do partial weightbearing only as pain allows or remain nonweightbearing and continue with the boot that she already has.    1 get an MRI of her left hindfoot evaluate peroneal tendons and for possible cuboid fracture to help tailor treatment protocol.    She will unlikely be able to work for at least 4 to 6 weeks and will go ahead and schedule to see her back in 3 weeks and I  will let her know if we have the MRI in the interim and anything changes with treatment protocol.    We had a pleasant conversation and she will call if she has any further problems or questions

## 2020-01-06 ENCOUNTER — OFFICE VISIT (OUTPATIENT)
Dept: ORTHOPEDIC SURGERY | Facility: CLINIC | Age: 35
End: 2020-01-06

## 2020-01-06 VITALS — TEMPERATURE: 98.2 F | BODY MASS INDEX: 20.73 KG/M2 | WEIGHT: 140 LBS | HEIGHT: 69 IN

## 2020-01-06 DIAGNOSIS — S92.215A CLOSED NONDISPLACED FRACTURE OF CUBOID OF LEFT FOOT, INITIAL ENCOUNTER: Primary | ICD-10-CM

## 2020-01-06 DIAGNOSIS — S92.025A CLOSED NONDISPLACED FRACTURE OF ANTERIOR PROCESS OF LEFT CALCANEUS, INITIAL ENCOUNTER: ICD-10-CM

## 2020-01-06 DIAGNOSIS — S86.312A PERONEAL TENDON TEAR, LEFT, INITIAL ENCOUNTER: ICD-10-CM

## 2020-01-06 DIAGNOSIS — R93.7 BONE MARROW EDEMA: ICD-10-CM

## 2020-01-06 PROCEDURE — 99213 OFFICE O/P EST LOW 20 MIN: CPT | Performed by: ORTHOPAEDIC SURGERY

## 2020-01-06 PROCEDURE — 28450 TX TARSAL B1 FX W/O MNPJ EA: CPT | Performed by: ORTHOPAEDIC SURGERY

## 2020-01-06 NOTE — PROGRESS NOTES
"Ankle Follow Up      Patient: Niki Nuñez    YOB: 1985 34 y.o. female    Chief Complaints: Ankle doing little better    History of Present Illness:Patient sustained an injury to her left foot and ankle when she was walking her dog on some train tracks on 11/30/2019 and fell.  She is unsure of what the mechanism was.    She was seen with this on 12/11/2019 and sent for MRI.    She states that her pain is less and bruising has resolved.  She has \"accidentally\" put some weight on it several times with less pain than she had initially.    Still describes only mild slight aching pain in the inferolateral aspect of the left hindfoot usually only with bearing weight but really no significant pain at rest.    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     Does have a history of muscular dystrophy with chronic atrophy of the left leg with chronic inability to stand on her tiptoes on the left foot was without any complaints of pain or instability in the ankle prior to this injury  HPI    ROS: ankle pain  Past Medical History:   Diagnosis Date   • Cataracts, bilateral    • Classic myotonic dystrophy type 1 (CMS/[facility]) 11/2016    [facility]   • POLANCO (dyspnea on exertion)    • Gastrointestinal dysfunction    • Muscular dystrophy (CMS/HCC)    • URI (upper respiratory infection)        Physical Exam:   Vitals:    01/06/20 0757   Temp: 98.2 °F (36.8 °C)   Weight: 63.5 kg (140 lb)   Height: 175.3 cm (69\")     Well developed with normal mood.  She is with her .  She has a mild cavovarus foot with really no focal discomfort today along the peroneal tendons or dorsal lateral hindfoot.  She was able to actively sinai and was nontender over the anterolateral aspect of the ankle.      Radiology: MRI films and report of the left hindfoot dated 1/2/2020 [facility] which show a comminuted nondisplaced cuboid fracture with " intra-articular extension to the calcaneocuboid and fourth tarsometatarsal articulation with no cortical articular step-off and extensive surrounding edema.  There is also microtrabecular fracture with surrounding edema the anterior calcaneus and anterior calcaneal process and microtrabecular injury involving the lateral aspect of the navicular.  Mild stress related contusion at the superior aspect of the calcaneus adjacent to the posterior subtalar joint consistent with posterior impingement and a Stieda process is noted.    There is a torn bifurcate ligament at the medial and lateral limbs and torn dorsal calcaneocuboid ligament as well as a short segment just malleolar partial-thickness delamination tear at the medial surface of the peroneus longus tendon with some tenosynovitis but brevis appears intact.  There is some low-grade contusion of the plantar musculature at the FDP and quadratus plantae musculature      Assessment/Plan: 1.  Left comminuted nondisplaced cuboid fracture with intra-articular extension but no cortical step-off  2. Left peroneus longus partial thickness juxta malleolar partial-thickness delamination tear  3.  Microtrabecular fracture with surrounding osteoedema anterior process calcaneus  4.  Microtrabecular injury involving lateral aspect of the navicular  5.  Torn bifurcate and dorsal calcaneal cuboid ligament  6.  Grade 1 quadratus plantae and flexor digitorum brevis muscles strains/contusions    I reviewed the etiology of these injuries in detail with patient and her  including use of a model.  At this point I would not recommend any surgical treatment nor does she desire it.    I have her continue with her boot and may do touchdown weightbearing for the next 10 to 14 days and then progress to 50% weightbearing as pain allows.    I will see her back in 3 weeks x-rays of her left foot.

## 2020-01-08 ENCOUNTER — LAB (OUTPATIENT)
Dept: LAB | Facility: HOSPITAL | Age: 35
End: 2020-01-08

## 2020-01-08 ENCOUNTER — OFFICE VISIT (OUTPATIENT)
Dept: CARDIOLOGY | Facility: CLINIC | Age: 35
End: 2020-01-08

## 2020-01-08 VITALS
HEIGHT: 69 IN | SYSTOLIC BLOOD PRESSURE: 98 MMHG | WEIGHT: 136 LBS | BODY MASS INDEX: 20.14 KG/M2 | DIASTOLIC BLOOD PRESSURE: 64 MMHG

## 2020-01-08 DIAGNOSIS — I48.0 PAROXYSMAL ATRIAL FIBRILLATION (HCC): ICD-10-CM

## 2020-01-08 DIAGNOSIS — G71.11: Primary | ICD-10-CM

## 2020-01-08 LAB
ANION GAP SERPL CALCULATED.3IONS-SCNC: 14.1 MMOL/L (ref 5–15)
BUN BLD-MCNC: 18 MG/DL (ref 6–20)
BUN/CREAT SERPL: 28.1 (ref 7–25)
CALCIUM SPEC-SCNC: 10.2 MG/DL (ref 8.6–10.5)
CHLORIDE SERPL-SCNC: 99 MMOL/L (ref 98–107)
CO2 SERPL-SCNC: 24.9 MMOL/L (ref 22–29)
CREAT BLD-MCNC: 0.64 MG/DL (ref 0.57–1)
GFR SERPL CREATININE-BSD FRML MDRD: 108 ML/MIN/1.73
GLUCOSE BLD-MCNC: 89 MG/DL (ref 65–99)
POTASSIUM BLD-SCNC: 4.5 MMOL/L (ref 3.5–5.2)
SODIUM BLD-SCNC: 138 MMOL/L (ref 136–145)
T-UPTAKE NFR SERPL: 1.08 TBI (ref 0.8–1.3)
T4 SERPL-MCNC: 8.61 MCG/DL (ref 4.5–11.7)
TSH SERPL DL<=0.05 MIU/L-ACNC: 2.01 UIU/ML (ref 0.27–4.2)

## 2020-01-08 PROCEDURE — 84479 ASSAY OF THYROID (T3 OR T4): CPT

## 2020-01-08 PROCEDURE — 80048 BASIC METABOLIC PNL TOTAL CA: CPT

## 2020-01-08 PROCEDURE — 84436 ASSAY OF TOTAL THYROXINE: CPT

## 2020-01-08 PROCEDURE — 36415 COLL VENOUS BLD VENIPUNCTURE: CPT

## 2020-01-08 PROCEDURE — 93000 ELECTROCARDIOGRAM COMPLETE: CPT | Performed by: INTERNAL MEDICINE

## 2020-01-08 PROCEDURE — 99204 OFFICE O/P NEW MOD 45 MIN: CPT | Performed by: INTERNAL MEDICINE

## 2020-01-08 PROCEDURE — 84443 ASSAY THYROID STIM HORMONE: CPT

## 2020-01-08 NOTE — PROGRESS NOTES
Date of Office Visit: 2020  Encounter Provider: Anthony Cortes MD  Place of Service: [facility] CARDIOLOGY  Patient Name: Niki Nuñez  : 1985    Subjective:     Encounter Date:2020      Patient ID: Niki Nuñez is a 34 y.o. female who has a cc of  Myotonic dystrophy     She has gene tested positive. She has been followed by Dr Lopez.     She feels fine. 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 She has myotonia in the right hand.     She has palp sometimes with coffee. Stamina is not great       No anginal chest pain,   No sig mckinley,   No soa,   No fainting,  No orthostasis.   No edema.   Exercise tolerance: decent.     There have been no hospital admission since the last visit.     There have been no bleeding events.       Past Medical History:   Diagnosis Date   • Cataracts, bilateral    • Classic myotonic dystrophy type 1 (CMS/HCC) 2016    [facility]   • MCKINLEY (dyspnea on exertion)    • Gastrointestinal dysfunction    • Muscular dystrophy (CMS/HCC)    • URI (upper respiratory infection)        Social History     Socioeconomic History   • Marital status:      Spouse name: Not on file   • Number of children: 0   • Years of education: Not on file   • Highest education level: Not on file   Occupational History   • Occupation: RN   Tobacco Use   • Smoking status: Never Smoker   • Smokeless tobacco: Never Used   Substance and Sexual Activity   • Alcohol use: Yes     Comment: 2-4 per week   • Sexual activity: Yes     Partners: Male       Review of Systems   Constitution: Negative for fever and night sweats.   HENT: Negative for ear pain and stridor.    Eyes: Negative for discharge and visual halos.   Cardiovascular: Negative for cyanosis.   Respiratory: Negative for hemoptysis and sputum production.    Hematologic/Lymphatic: Negative for adenopathy.   Skin: Negative for nail changes and unusual hair distribution.   Musculoskeletal: Negative  "for gout and joint swelling.   Gastrointestinal: Negative for bowel incontinence and flatus.   Genitourinary: Negative for dysuria and flank pain.   Neurological: Negative for seizures and tremors.   Psychiatric/Behavioral: Negative for altered mental status. The patient is not nervous/anxious.             Objective:     Vitals:    01/08/20 1402   BP: 98/64   BP Location: Right arm   Patient Position: Sitting   Cuff Size: Large Adult   Weight: 61.7 kg (136 lb)   Height: 175.3 cm (69\")         Physical Exam   Constitutional: She is oriented to person, place, and time.   HENT:   Head: Normocephalic and atraumatic.   Eyes: Right eye exhibits no discharge. Left eye exhibits no discharge.   Neck: No JVD present. No thyromegaly present.   Cardiovascular: Normal rate and regular rhythm. Exam reveals no gallop and no friction rub.   No murmur heard.  Pulmonary/Chest: Effort normal and breath sounds normal. She has no rales.   Abdominal: Soft. Bowel sounds are normal. There is no tenderness.   Musculoskeletal: Normal range of motion. She exhibits no edema or deformity.   Neurological: She is alert and oriented to person, place, and time. She exhibits normal muscle tone.   Skin: Skin is warm and dry. No erythema.   Psychiatric: She has a normal mood and affect. Her behavior is normal. Thought content normal.         ECG 12 Lead  Date/Time: 1/8/2020 2:38 PM  Performed by: Anthony Cortes MD  Authorized by: Anthony Cortes MD   Comparison: compared with previous ECG   Similar to previous ECG  Rhythm: atrial fibrillation            Lab Review:       Assessment:          Diagnosis Plan   1. Classic myotonic dystrophy type 1 (CMS/HCC)     2. Paroxysmal atrial fibrillation (CMS/HCC)            Plan:       She is fine -- no cardiac symptoms     Exam shows AF -- this is new.     Plan -- do a 14 day to assess AF burden     Eventually we will have decide on treatment of that    Eventually -- I will do another echo but now b/c she is in " AF. She has had two normal LV EF in 2017 and 2018

## 2020-01-27 ENCOUNTER — TELEPHONE (OUTPATIENT)
Dept: CARDIOLOGY | Facility: CLINIC | Age: 35
End: 2020-01-27

## 2020-01-27 ENCOUNTER — OFFICE VISIT (OUTPATIENT)
Dept: ORTHOPEDIC SURGERY | Facility: CLINIC | Age: 35
End: 2020-01-27

## 2020-01-27 VITALS — HEIGHT: 69 IN | WEIGHT: 136 LBS | TEMPERATURE: 98.2 F | BODY MASS INDEX: 20.14 KG/M2

## 2020-01-27 DIAGNOSIS — S92.025D CLOSED NONDISPLACED FRACTURE OF ANTERIOR PROCESS OF LEFT CALCANEUS WITH ROUTINE HEALING, SUBSEQUENT ENCOUNTER: ICD-10-CM

## 2020-01-27 DIAGNOSIS — R52 PAIN: ICD-10-CM

## 2020-01-27 DIAGNOSIS — R93.7 BONE MARROW EDEMA: Primary | ICD-10-CM

## 2020-01-27 DIAGNOSIS — S86.312D PERONEAL TENDON TEAR, LEFT, SUBSEQUENT ENCOUNTER: ICD-10-CM

## 2020-01-27 DIAGNOSIS — M62.562 LEFT CALF ATROPHY: ICD-10-CM

## 2020-01-27 DIAGNOSIS — S92.215D CLOSED NONDISPLACED FRACTURE OF CUBOID OF LEFT FOOT WITH ROUTINE HEALING, SUBSEQUENT ENCOUNTER: ICD-10-CM

## 2020-01-27 PROCEDURE — 73630 X-RAY EXAM OF FOOT: CPT | Performed by: ORTHOPAEDIC SURGERY

## 2020-01-27 PROCEDURE — 99024 POSTOP FOLLOW-UP VISIT: CPT | Performed by: ORTHOPAEDIC SURGERY

## 2020-01-27 NOTE — PROGRESS NOTES
Ankle Follow Up      Patient: Niki Nuñez    YOB: 1985 34 y.o. female    Chief Complaints: Ankle feeling better    History of Present Illness:Patient sustained an injury to her left foot and ankle when she was walking her dog on some train tracks on 11/30/2019 and fell.  She is unsure of what the mechanism was.     She was seen with this on 12/11/2019 and sent for MRI.    She was last seen on 1/6/2020 at which time her pain was less and bruising had resolved and had put weight on it several times but with less pain than she had initially.  That time she had mild slight aching pain in the inferolateral aspect of the left hindfoot.    She was instructed on progression to 50% weightbearing with her boot and crutches.    She has been doing as such and says her ankle and foot are feeling better she was on quite a bit for you well again with some increased pain that has improved.  She still gets some very slight intermittent aching pain in the inferolateral aspect of the left midfoot.  No focal pain about the ankle though.        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  She is starting a new job in about 3 weeks that will allow her to work more with some restrictions.     Does have a history of muscular dystrophy with chronic atrophy of the left leg with chronic inability to stand on her tiptoes on the left foot was without any complaints of pain or instability in the ankle prior to this injury she does however feel that she has had increased calf atrophy after this injury more so than she had prior to that..        HPI    ROS: ankle pain  Past Medical History:   Diagnosis Date   • Cataracts, bilateral    • Classic myotonic dystrophy type 1 (CMS/HCC) 11/2016    [facility]   • POLANCO (dyspnea on exertion)    • Gastrointestinal dysfunction    • Muscular dystrophy (CMS/HCC)    • URI (upper respiratory infection)        Physical  "Exam:   Vitals:    01/27/20 0816   Temp: 98.2 °F (36.8 °C)   Weight: 61.7 kg (136 lb)   Height: 175.3 cm (69\")     Well developed with normal mood.  She is with her .  There is no swelling obvious about the foot or ankle.  There was no focal tenderness along the peroneal tendons to palpation or with resisted eversion.  Has mild cavovarus foot and no focal discomfort today along the cuboid or anterior process of the calcaneus or dorsal medial midfoot.      Radiology:3 views left foot ordered evaluate fracture alignment reviewed and compared with previous x-rays 3 views left foot ordered to evaluate fracture alignment reviewed and compared with previous x-rays.  There is been no change in alignment to the cuboid which appears to remain well aligned as does the navicular and anterior process of the calcaneus.  No gross midfoot malalignment.      Assessment/Plan: 1.  Left comminuted nondisplaced cuboid fracture with intra-articular extension but no cortical step-off *  2. Left peroneus longus partial thickness juxta malleolar partial-thickness delamination tear  3.  Microtrabecular fracture with surrounding osteoedema anterior process calcaneus  4.  Microtrabecular injury involving lateral aspect of the navicular  5.  Torn bifurcate and dorsal calcaneal cuboid ligament  6.  Grade 1 quadratus plantae and flexor digitorum brevis muscles strains/contusions     Overall she is improving I would not recommend any surgical treatment at this time.    Allow her to progress to weightbearing with her boot with the power step orthotic in it with 1 crutch under the contralateral arm for the next 7 to 10 days and if pain improvement then wean off the crutches altogether but continue with her boot.    We will have her start physical therapy with [facility] for calf atrophy and for range of motion and strengthening to the foot and possible even custom orthotics.    I will see her back in 3 weeks x-rays of her left foot  "

## 2020-01-28 ENCOUNTER — TELEPHONE (OUTPATIENT)
Dept: ORTHOPEDIC SURGERY | Facility: CLINIC | Age: 35
End: 2020-01-28

## 2020-01-28 NOTE — TELEPHONE ENCOUNTER
Work note written. Spoke with patient and let her know it was done and she stated that she would come by the office and pick it up.

## 2020-01-28 NOTE — TELEPHONE ENCOUNTER
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 She will use a cane for short distances and knee roller for long distances? Please advise.

## 2020-01-28 NOTE — TELEPHONE ENCOUNTER
Patient says MWREBECA told her to walk with a cane or 1 crutch for 1 1/2 weeks and then just with the boot after that. Is she supposed to return to work then? 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 Please advise.

## 2020-01-28 NOTE — TELEPHONE ENCOUNTER
Probably best to hold her out of work until I see her back to reassess in 3 weeks  If that works for her.

## 2020-02-10 ENCOUNTER — OFFICE VISIT (OUTPATIENT)
Dept: CARDIOLOGY | Facility: CLINIC | Age: 35
End: 2020-02-10

## 2020-02-10 VITALS
WEIGHT: 147 LBS | SYSTOLIC BLOOD PRESSURE: 116 MMHG | HEIGHT: 69 IN | BODY MASS INDEX: 21.77 KG/M2 | DIASTOLIC BLOOD PRESSURE: 78 MMHG | HEART RATE: 80 BPM

## 2020-02-10 DIAGNOSIS — I48.0 PAROXYSMAL ATRIAL FIBRILLATION (HCC): Primary | ICD-10-CM

## 2020-02-10 DIAGNOSIS — G71.11: ICD-10-CM

## 2020-02-10 PROCEDURE — 93000 ELECTROCARDIOGRAM COMPLETE: CPT | Performed by: INTERNAL MEDICINE

## 2020-02-10 PROCEDURE — 99214 OFFICE O/P EST MOD 30 MIN: CPT | Performed by: INTERNAL MEDICINE

## 2020-02-10 NOTE — PROGRESS NOTES
Date of Office Visit: 02/10/2020  Encounter Provider: Anthony Cortes MD  Place of Service: [facility] CARDIOLOGY  Patient Name: Niki Nuñez  : 1985    Subjective:     Encounter Date:02/10/2020      Patient ID: Niki Nuñez is a 34 y.o. female who has a cc of  Myotonic dystrophy--     No complaints.         The patient had a good year.   No anginal chest pain,   No sig polanco,   No soa,   No fainting,  No orthostasis.   No edema.   Exercise tolerance: limited by her fracture --     There have been no hospital admission since the last visit.     There have been no bleeding events.       Past Medical History:   Diagnosis Date   • Cataracts, bilateral    • Classic myotonic dystrophy type 1 (CMS/HCC) 2016    [facility]   • POLANCO (dyspnea on exertion)    • Gastrointestinal dysfunction    • Muscular dystrophy (CMS/HCC)    • URI (upper respiratory infection)        Social History     Socioeconomic History   • Marital status:      Spouse name: Not on file   • Number of children: 0   • Years of education: Not on file   • Highest education level: Not on file   Occupational History   • Occupation: RN   Tobacco Use   • Smoking status: Never Smoker   • Smokeless tobacco: Never Used   Substance and Sexual Activity   • Alcohol use: Yes     Comment: 2-4 per week   • Sexual activity: Yes     Partners: Male       Review of Systems   Constitution: Negative for fever and night sweats.   HENT: Negative for ear pain and stridor.    Eyes: Negative for discharge and visual halos.   Cardiovascular: Negative for cyanosis.   Respiratory: Negative for hemoptysis and sputum production.    Hematologic/Lymphatic: Negative for adenopathy.   Skin: Negative for nail changes and unusual hair distribution.   Musculoskeletal: Negative for gout and joint swelling.   Gastrointestinal: Negative for bowel incontinence and flatus.   Genitourinary: Negative for dysuria and flank pain.  "  Neurological: Negative for seizures and tremors.   Psychiatric/Behavioral: Negative for altered mental status. The patient is not nervous/anxious.             Objective:     Vitals:    02/10/20 1214   BP: 116/78   BP Location: Right arm   Patient Position: Sitting   Cuff Size: Large Adult   Pulse: 80   Weight: 66.7 kg (147 lb)   Height: 175.3 cm (69\")         Physical Exam   Constitutional: She is oriented to person, place, and time.   HENT:   Head: Normocephalic and atraumatic.   Eyes: Right eye exhibits no discharge. Left eye exhibits no discharge.   Neck: No JVD present. No thyromegaly present.   Cardiovascular: Normal rate and regular rhythm. Exam reveals no gallop and no friction rub.   No murmur heard.  Pulmonary/Chest: Effort normal and breath sounds normal. She has no rales.   Abdominal: Soft. Bowel sounds are normal. There is no tenderness.   Musculoskeletal: Normal range of motion. She exhibits no edema or deformity.   Neurological: She is alert and oriented to person, place, and time. She exhibits normal muscle tone.   Skin: Skin is warm and dry. No erythema.   Psychiatric: She has a normal mood and affect. Her behavior is normal. Thought content normal.         ECG 12 Lead  Date/Time: 2/10/2020 12:49 PM  Performed by: Anthony Cortes MD  Authorized by: Anthony Cortes MD   Comparison: compared with previous ECG   Similar to previous ECG  Rhythm: sinus rhythm  Rate: normal  Conduction: conduction normal  ST Segments: ST segments normal  T Waves: T waves normal  QRS axis: normal    Clinical impression: normal ECG            Lab Review:       Assessment:          Diagnosis Plan   1. Paroxysmal atrial fibrillation (CMS/HCC)     2. Classic myotonic dystrophy type 1 (CMS/HCC)            Plan:       It is weird -- when she saw me last she was in AF but did feel it. Then a zio showed that this was a one hour episode and it stopped and then no AF nothing nada for 13 days 23 hours.     She has no RF for stroke " so no AC.     She might check her pulse once in awhile.     She has myotonic dystrophy and will check an echo this year.     Prev echos are normal.

## 2020-02-19 ENCOUNTER — OFFICE VISIT (OUTPATIENT)
Dept: ORTHOPEDIC SURGERY | Facility: CLINIC | Age: 35
End: 2020-02-19

## 2020-02-19 VITALS — HEIGHT: 69 IN | WEIGHT: 146 LBS | BODY MASS INDEX: 21.62 KG/M2 | TEMPERATURE: 98.3 F

## 2020-02-19 DIAGNOSIS — M62.562 LEFT CALF ATROPHY: ICD-10-CM

## 2020-02-19 DIAGNOSIS — S92.215D CLOSED NONDISPLACED FRACTURE OF CUBOID OF LEFT FOOT WITH ROUTINE HEALING, SUBSEQUENT ENCOUNTER: ICD-10-CM

## 2020-02-19 DIAGNOSIS — S86.312D PERONEAL TENDON TEAR, LEFT, SUBSEQUENT ENCOUNTER: Primary | ICD-10-CM

## 2020-02-19 DIAGNOSIS — S92.025D CLOSED NONDISPLACED FRACTURE OF ANTERIOR PROCESS OF LEFT CALCANEUS WITH ROUTINE HEALING, SUBSEQUENT ENCOUNTER: ICD-10-CM

## 2020-02-19 PROCEDURE — 73630 X-RAY EXAM OF FOOT: CPT | Performed by: ORTHOPAEDIC SURGERY

## 2020-02-19 PROCEDURE — 99024 POSTOP FOLLOW-UP VISIT: CPT | Performed by: ORTHOPAEDIC SURGERY

## 2020-02-19 NOTE — PROGRESS NOTES
"Foot Follow Up      Patient: Niki Nuñez    YOB: 1985 34 y.o. female    Chief Complaints: Foot feeling better    History of Present Illness:Patient sustained an injury to her left foot and ankle when she was walking her dog on some train tracks on 11/30/2019 and fell.  She is unsure of what the mechanism was    She was last seen on 1/27/2020 with no recommendations for surgical treatment allowed to progress to weightbearing and has done so with just her boot now without crutches.  She has not yet started physical therapy due to some scheduling conflicts with the therapist.    She reports that her foot is feeling much better only gets a little bit of achiness with increased walking but better with ibuprofen.    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        HPI    ROS: Foot pain  Past Medical History:   Diagnosis Date   • Cataracts, bilateral    • Classic myotonic dystrophy type 1 (CMS/HCC) 11/2016    [facility]   • POLANCO (dyspnea on exertion)    • Gastrointestinal dysfunction    • Muscular dystrophy (CMS/HCC)    • URI (upper respiratory infection)      Physical Exam:   Vitals:    02/19/20 1146   Temp: 98.3 °F (36.8 °C)   TempSrc: Temporal   Weight: 66.2 kg (146 lb)   Height: 175.3 cm (69\")   PainSc: 0-No pain     Well developed with normal mood.  Does have residual calf atrophy of the left leg but no focal discomfort over the cuboid and no focal tenderness along the peroneal tendons to palpation or with resisted eversion.  No focal discomfort over the anterolateral " hindfoot or midfoot.      Radiology: 3 views of the left foot ordered evaluate fracture alignment reviewed and compared with previous x-rays.  The fracture along the lateral aspect of the cuboid appears to be healing and I will see any obvious joint step-off.  No gross malalignment to the midfoot      Assessment/Plan:  1.  Left comminuted nondisplaced cuboid fracture with intra-articular extension but no cortical step-off *  2. Left peroneus longus partial thickness juxta malleolar partial-thickness delamination tear  3.  Microtrabecular fracture with surrounding osteoedema anterior process calcaneus  4.  Microtrabecular injury involving lateral aspect of the navicular  5.  Torn bifurcate and dorsal calcaneal cuboid ligament  6.  Grade 1 quadratus plantae and flexor digitorum brevis muscles strains/contusions     Overall she seems to be doing well and is pleased with her outcome.  We will have her start weaning out of her boot into athletic shoes around the house for the next 5 days or so and if she does well with that may start weaning out of the boot at work.    She has any increase in pain she will back off and let me know we will have her go ahead and get started with some physical therapy to work on strengthening range of motion etc. and I will see her back in 3 to 4 weeks x-rays of her left foot if she is having any pain.

## 2020-02-25 ENCOUNTER — HOSPITAL ENCOUNTER (OUTPATIENT)
Dept: CARDIOLOGY | Facility: HOSPITAL | Age: 35
Discharge: HOME | End: 2020-02-25
Admitting: INTERNAL MEDICINE

## 2020-02-25 VITALS
OXYGEN SATURATION: 98 % | HEIGHT: 69 IN | WEIGHT: 146 LBS | HEART RATE: 101 BPM | BODY MASS INDEX: 21.62 KG/M2 | DIASTOLIC BLOOD PRESSURE: 60 MMHG | SYSTOLIC BLOOD PRESSURE: 110 MMHG

## 2020-02-25 DIAGNOSIS — G71.11: ICD-10-CM

## 2020-02-25 DIAGNOSIS — I48.0 PAROXYSMAL ATRIAL FIBRILLATION (HCC): ICD-10-CM

## 2020-02-25 LAB
AORTIC ARCH: 2.6 CM
AORTIC ROOT ANNULUS: 2.1 CM
ASCENDING AORTA: 2.6 CM
BH CV ECHO MEAS - ACS: 2 CM
BH CV ECHO MEAS - AO MAX PG (FULL): 3.7 MMHG
BH CV ECHO MEAS - AO MAX PG: 6.6 MMHG
BH CV ECHO MEAS - AO MEAN PG (FULL): 2.3 MMHG
BH CV ECHO MEAS - AO MEAN PG: 3.9 MMHG
BH CV ECHO MEAS - AO ROOT AREA (BSA CORRECTED): 1.4
BH CV ECHO MEAS - AO ROOT AREA: 5.2 CM^2
BH CV ECHO MEAS - AO ROOT DIAM: 2.6 CM
BH CV ECHO MEAS - AO V2 MAX: 128.1 CM/SEC
BH CV ECHO MEAS - AO V2 MEAN: 92.6 CM/SEC
BH CV ECHO MEAS - AO V2 VTI: 26.2 CM
BH CV ECHO MEAS - ASC AORTA: 2.3 CM
BH CV ECHO MEAS - AVA(I,A): 1.6 CM^2
BH CV ECHO MEAS - AVA(I,D): 1.6 CM^2
BH CV ECHO MEAS - AVA(V,A): 1.9 CM^2
BH CV ECHO MEAS - AVA(V,D): 1.9 CM^2
BH CV ECHO MEAS - BSA(HAYCOCK): 1.8 M^2
BH CV ECHO MEAS - BSA: 1.8 M^2
BH CV ECHO MEAS - BZI_BMI: 21.6 KILOGRAMS/M^2
BH CV ECHO MEAS - BZI_METRIC_HEIGHT: 175.3 CM
BH CV ECHO MEAS - BZI_METRIC_WEIGHT: 66.2 KG
BH CV ECHO MEAS - EDV(MOD-SP2): 61 ML
BH CV ECHO MEAS - EDV(MOD-SP4): 43 ML
BH CV ECHO MEAS - EDV(TEICH): 68.5 ML
BH CV ECHO MEAS - EF(CUBED): 60.7 %
BH CV ECHO MEAS - EF(MOD-BP): 53 %
BH CV ECHO MEAS - EF(MOD-SP2): 52.5 %
BH CV ECHO MEAS - EF(MOD-SP4): 53.5 %
BH CV ECHO MEAS - EF(TEICH): 52.9 %
BH CV ECHO MEAS - ESV(MOD-SP2): 29 ML
BH CV ECHO MEAS - ESV(MOD-SP4): 20 ML
BH CV ECHO MEAS - ESV(TEICH): 32.3 ML
BH CV ECHO MEAS - FS: 26.8 %
BH CV ECHO MEAS - IVS/LVPW: 0.88
BH CV ECHO MEAS - IVSD: 0.87 CM
BH CV ECHO MEAS - LAT PEAK E' VEL: 15 CM/SEC
BH CV ECHO MEAS - LV DIASTOLIC VOL/BSA (35-75): 23.8 ML/M^2
BH CV ECHO MEAS - LV MASS(C)D: 112.7 GRAMS
BH CV ECHO MEAS - LV MASS(C)DI: 62.4 GRAMS/M^2
BH CV ECHO MEAS - LV MAX PG: 2.9 MMHG
BH CV ECHO MEAS - LV MEAN PG: 1.6 MMHG
BH CV ECHO MEAS - LV SYSTOLIC VOL/BSA (12-30): 11.1 ML/M^2
BH CV ECHO MEAS - LV V1 MAX: 84.8 CM/SEC
BH CV ECHO MEAS - LV V1 MEAN: 57.4 CM/SEC
BH CV ECHO MEAS - LV V1 VTI: 14.8 CM
BH CV ECHO MEAS - LVIDD: 4 CM
BH CV ECHO MEAS - LVIDS: 2.9 CM
BH CV ECHO MEAS - LVLD AP2: 8.4 CM
BH CV ECHO MEAS - LVLD AP4: 7.9 CM
BH CV ECHO MEAS - LVLS AP2: 6.9 CM
BH CV ECHO MEAS - LVLS AP4: 7.4 CM
BH CV ECHO MEAS - LVOT AREA (M): 2.8 CM^2
BH CV ECHO MEAS - LVOT AREA: 2.8 CM^2
BH CV ECHO MEAS - LVOT DIAM: 1.9 CM
BH CV ECHO MEAS - LVPWD: 0.99 CM
BH CV ECHO MEAS - MED PEAK E' VEL: 12 CM/SEC
BH CV ECHO MEAS - MV A DUR: 0.08 SEC
BH CV ECHO MEAS - MV A MAX VEL: 56.3 CM/SEC
BH CV ECHO MEAS - MV DEC SLOPE: 880.1 CM/SEC^2
BH CV ECHO MEAS - MV DEC TIME: 0.13 SEC
BH CV ECHO MEAS - MV E MAX VEL: 99.4 CM/SEC
BH CV ECHO MEAS - MV E/A: 1.8
BH CV ECHO MEAS - MV MAX PG: 6.8 MMHG
BH CV ECHO MEAS - MV MEAN PG: 3.1 MMHG
BH CV ECHO MEAS - MV P1/2T MAX VEL: 131.4 CM/SEC
BH CV ECHO MEAS - MV P1/2T: 43.7 MSEC
BH CV ECHO MEAS - MV V2 MAX: 130.3 CM/SEC
BH CV ECHO MEAS - MV V2 MEAN: 84.4 CM/SEC
BH CV ECHO MEAS - MV V2 VTI: 26.5 CM
BH CV ECHO MEAS - MVA P1/2T LCG: 1.7 CM^2
BH CV ECHO MEAS - MVA(P1/2T): 5 CM^2
BH CV ECHO MEAS - MVA(VTI): 1.6 CM^2
BH CV ECHO MEAS - PA ACC TIME: 0.09 SEC
BH CV ECHO MEAS - PA MAX PG (FULL): 0.45 MMHG
BH CV ECHO MEAS - PA MAX PG: 3.5 MMHG
BH CV ECHO MEAS - PA PR(ACCEL): 39.8 MMHG
BH CV ECHO MEAS - PA V2 MAX: 93.7 CM/SEC
BH CV ECHO MEAS - PULM A REVS DUR: 0.08 SEC
BH CV ECHO MEAS - PULM A REVS VEL: 32.6 CM/SEC
BH CV ECHO MEAS - PULM DIAS VEL: 53.1 CM/SEC
BH CV ECHO MEAS - PULM S/D: 0.82
BH CV ECHO MEAS - PULM SYS VEL: 43.7 CM/SEC
BH CV ECHO MEAS - RAP SYSTOLE: 3 MMHG
BH CV ECHO MEAS - RV MAX PG: 3.1 MMHG
BH CV ECHO MEAS - RV MEAN PG: 2.1 MMHG
BH CV ECHO MEAS - RV V1 MAX: 87.4 CM/SEC
BH CV ECHO MEAS - RV V1 MEAN: 67.7 CM/SEC
BH CV ECHO MEAS - RV V1 VTI: 13.8 CM
BH CV ECHO MEAS - RVSP: 13 MMHG
BH CV ECHO MEAS - SI(AO): 75.3 ML/M^2
BH CV ECHO MEAS - SI(CUBED): 20.9 ML/M^2
BH CV ECHO MEAS - SI(LVOT): 23.1 ML/M^2
BH CV ECHO MEAS - SI(MOD-SP2): 17.7 ML/M^2
BH CV ECHO MEAS - SI(MOD-SP4): 12.7 ML/M^2
BH CV ECHO MEAS - SI(TEICH): 20.1 ML/M^2
BH CV ECHO MEAS - SUP REN AO DIAM: 1.5 CM
BH CV ECHO MEAS - SV(AO): 136.1 ML
BH CV ECHO MEAS - SV(CUBED): 37.9 ML
BH CV ECHO MEAS - SV(LVOT): 41.8 ML
BH CV ECHO MEAS - SV(MOD-SP2): 32 ML
BH CV ECHO MEAS - SV(MOD-SP4): 23 ML
BH CV ECHO MEAS - SV(TEICH): 36.2 ML
BH CV ECHO MEAS - TAPSE (>1.6): 2.7 CM2
BH CV ECHO MEAS - TR MAX VEL: 156.4 CM/SEC
BH CV ECHO MEASUREMENTS AVERAGE E/E' RATIO: 7.36
BH CV VAS BP RIGHT ARM: NORMAL MMHG
BH CV XLRA - RV BASE: 2.2 CM
BH CV XLRA - RV LENGTH: 7.6 CM
BH CV XLRA - RV MID: 3 CM
BH CV XLRA - TDI S': 11 CM/SEC
LEFT ATRIUM VOLUME INDEX: 10 ML/M2
SINUS: 2.6 CM
STJ: 2.3 CM

## 2020-02-25 PROCEDURE — 93306 TTE W/DOPPLER COMPLETE: CPT

## 2020-02-25 PROCEDURE — 93306 TTE W/DOPPLER COMPLETE: CPT | Performed by: INTERNAL MEDICINE

## 2020-02-26 ENCOUNTER — TELEPHONE (OUTPATIENT)
Dept: CARDIOLOGY | Facility: CLINIC | Age: 35
End: 2020-02-26

## 2020-02-26 NOTE — TELEPHONE ENCOUNTER
----- Message from Anthony Cortes MD sent at 2/25/2020  4:46 PM EST -----  Can you all let her know that he echo was normal

## 2020-03-11 ENCOUNTER — OFFICE VISIT (OUTPATIENT)
Dept: ORTHOPEDIC SURGERY | Facility: CLINIC | Age: 35
End: 2020-03-11

## 2020-03-11 VITALS — TEMPERATURE: 98.1 F | WEIGHT: 146.4 LBS | BODY MASS INDEX: 21.68 KG/M2 | HEIGHT: 69 IN

## 2020-03-11 DIAGNOSIS — M79.672 LEFT FOOT PAIN: Primary | ICD-10-CM

## 2020-03-11 DIAGNOSIS — M62.562 LEFT CALF ATROPHY: ICD-10-CM

## 2020-03-11 DIAGNOSIS — S86.312D PERONEAL TENDON TEAR, LEFT, SUBSEQUENT ENCOUNTER: ICD-10-CM

## 2020-03-11 DIAGNOSIS — R93.7 BONE MARROW EDEMA: ICD-10-CM

## 2020-03-11 DIAGNOSIS — S92.025D CLOSED NONDISPLACED FRACTURE OF ANTERIOR PROCESS OF LEFT CALCANEUS WITH ROUTINE HEALING, SUBSEQUENT ENCOUNTER: ICD-10-CM

## 2020-03-11 DIAGNOSIS — S92.215D CLOSED NONDISPLACED FRACTURE OF CUBOID OF LEFT FOOT WITH ROUTINE HEALING, SUBSEQUENT ENCOUNTER: ICD-10-CM

## 2020-03-11 PROCEDURE — 99024 POSTOP FOLLOW-UP VISIT: CPT | Performed by: ORTHOPAEDIC SURGERY

## 2020-03-11 PROCEDURE — 73630 X-RAY EXAM OF FOOT: CPT | Performed by: ORTHOPAEDIC SURGERY

## 2020-03-11 NOTE — PROGRESS NOTES
"Foot Follow Up      Patient: Niki Nuñez    YOB: 1985 34 y.o. female    Chief Complaints: Foot feels better    History of Present Illness:Patient sustained an injury to her left foot and ankle when she was walking her dog on some train tracks on 11/30/2019 and fell.  She is unsure of what the mechanism was    She has been followed for left cuboid fracture and microtrabecular fracture of the anterior process the calcaneus as well as a partial-thickness tear of the peroneus longus.    She was last seen on 2/19/2020 and has since weaned out of her boot into athletic shoes and is doing very well.  She still gets an occasional ache in the plantar lateral aspect of the left hindfoot with increased activity which improves steadily with rest.    She has not yet started physical therapy to work on her foot and her calf atrophy but is due to start at the end of this month.    Does have a history of muscular dystrophy with chronic atrophy of the left leg with chronic inability to stand on her tiptoes on the left foot was without any complaints of pain or instability in the ankle prior to this injury she does however feel that she has had increased calf atrophy after this injury more so than she had prior to that..    HPI    ROS: Foot pain  Past Medical History:   Diagnosis Date   • Cataracts, bilateral    • Classic myotonic dystrophy type 1 (CMS/HCC) 11/2016    [facility]   • POLANCO (dyspnea on exertion)    • Gastrointestinal dysfunction    • Muscular dystrophy (CMS/HCC)    • URI (upper respiratory infection)      Physical Exam:   Vitals:    03/11/20 1141   Temp: 98.1 °F (36.7 °C)   Weight: 66.4 kg (146 lb 6.4 oz)   Height: 175.3 cm (69\")   PainSc:   2     Well developed with normal mood.  On exam she is ambulating without antalgic gait in regular shoes.  There is no focal tenderness along the peroneal tendons and only minimal discomfort over the inferolateral aspect of the left hindfoot/midfoot area. "  She was able to actively plantarflex.  She had good eversion strength without significant discomfort on the peroneal tendons      Radiology: 3 views left foot ordered evaluate alignment and fracture reviewed and compared with previous x-rays.  Cuboid remains well aligned without any obvious cortical irregularities midfoot is well aligned as well.      Assessment/Plan:  1.  Left comminuted nondisplaced cuboid fracture with intra-articular extension but no cortical step-off *  2. Left peroneus longus partial thickness juxta malleolar partial-thickness delamination tear  3.  Microtrabecular fracture with surrounding osteoedema anterior process calcaneus  4.  Microtrabecular injury involving lateral aspect of the navicular  5.  Torn bifurcate and dorsal calcaneal cuboid ligament  6.  Grade 1 quadratus plantae and flexor digitorum brevis muscles strains/contusions     Overall she is improving and nothing further I would recommend as far as any surgical treatment or work-up.    She will begin physical therapy later this month and will continue in accommodative shoes and limit activity as pain allows.  If she has any persistent or worsening pain she will let me know otherwise I will see her back as needed

## 2020-05-28 ENCOUNTER — TREATMENT (OUTPATIENT)
Dept: PHYSICAL THERAPY | Facility: CLINIC | Age: 35
End: 2020-05-28

## 2020-05-28 DIAGNOSIS — R26.2 DIFFICULTY WALKING: ICD-10-CM

## 2020-05-28 DIAGNOSIS — Z78.9 DIFFICULTY NAVIGATING STAIRS: ICD-10-CM

## 2020-05-28 DIAGNOSIS — M79.672 CHRONIC FOOT PAIN, LEFT: Primary | ICD-10-CM

## 2020-05-28 DIAGNOSIS — G89.29 CHRONIC FOOT PAIN, LEFT: Primary | ICD-10-CM

## 2020-05-28 PROCEDURE — 97112 NEUROMUSCULAR REEDUCATION: CPT | Performed by: PHYSICAL THERAPIST

## 2020-05-28 PROCEDURE — 97162 PT EVAL MOD COMPLEX 30 MIN: CPT | Performed by: PHYSICAL THERAPIST

## 2020-05-28 NOTE — PROGRESS NOTES
Physical Therapy Initial Evaluation and Plan of Care      Patient: Niki Nuñez   : 1985  Diagnosis/ICD-10 Code:  Chronic foot pain, left [M79.672, G89.29]  Referring practitioner: John Huizar*  Date of Initial Visit: 2020  Today's Date: 2020  Patient seen for 1 sessions           Subjective: Miroslava reports that in 2019 she was walking and due to unlevel surfaces step in a way that caused a fractures to her L foot.  She fractured the cuboid.  She was placed in a boot and non-weight bearing for approximately 4 weeks and then began putting partial weight bearing in a boot for a period of time and gradually progressed to full weight bearing.  She has not been using the belt since late February.  Currently peak pain, 3/10, occurs in the L lateral and plantar aspect of the foot.  She notices some discomfort descending stairs and walking 2 miles seems to be an aggravation.  She has no symptoms parasthesia.  She does notice mild swelling at the end of a day.  Patient's goals: No pain and normal function.    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  She enjoys hiking, reading books and swimming    Objective     Observation:  She has slight bunion build up at the first MTP joint B, L greater than R, and slight hallux valgus deformities B, again L greater than R.    Motor control: L2-S2 were intact/equal B 4+ to 5/5 for all, ankle invertors were tested and L 4-/5 and R 4/5  Sensory control:  L1-S2 dermatomes were intact/eual  DTRs: Patellar and Achilles' were B equal/hypotonic  Upper motor neuron tests: Clonus and Babinski were B normal    Functional outcome score: Patient Specific Functional Scale  1. Descending stairs, 7/10  2. 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 Hiking, 6/10  Score 20/30, 33%  disability    Treatment:  Neuromuscular Reeducation:  kinesio taping for the L foot and ankle to improve motor action in the medial portion of the L foot to diminish forefoot pronation.  One strip placed from MTPs on plantar surface to the distal gastrocnemius at 100% tension and 2 strips form the plantar surface to the medial ankle at 50% tension.      Patient education:  She was instructed to blow dry the tape after bathing or showering to activate the adhesive of the tape to stay on her for 4 days, if possible.    Assessment:  Niki Nuñez is a 34 y.o. year-old female referred to physical therapy for chronic L foot pain. She presents with an evolving clinical presentation.  She has comorbidities to include soft/bony tissue adaptations to her kinetic chain secondary to her tendency to over pronate during weight bearing.  She also has issues with the L LE due to myotonic dystrophy.  She has no known personal factors  that may affect her progress in the plan of care.  Signs and symptoms are consistent with physical therapy diagnosis of chronic left foot pain, B hallux valgus deformites mildy, difficulty walking, navigating stairs and will need education for self care.      STGs to be met in 4 weeks  1. Miroslava notices reduced L foot pain using kinesio taping for arch support.   2. Custom orthotics are discussed and are considered for improved foot mechanics during weight bearing.  3. She is progressed with specific exercises for ankle inversion strengthening, L knee extensor strengthening and appropriate stretching for the ankle evertors and gastrocnemius musculature.    LTGs to be met in 12 weeks  1. PSFS improves by 15%.  2. Miroslava is independent with a HEP and self care education  3. MMT of the ankle invertors improve by 1/2 muscle grade B.  4. If custom orthotics are made she is wearing them without issue full time.    Plan:  Miroslava Nuñez is to be seen for chronic L foot pain in physical therapy.  Treatment  may consist of manual therapy, neuromuscular reeducation, therapeutic exercises, modalities, custom made orthotics and will be educated to become independent with self care. She will be seen for 1-2 x per week for up to 12 weeks.    Timed:  Manual Therapy:         mins  81358;  Therapeutic Exercise:         mins  86756;     Neuromuscular Abhilash:    10    mins  80610;    Therapeutic Activity:          mins  97518;     Gait Training:           mins  80883;     Ultrasound:          mins  14929;    Electrical Stimulation:         mins  66535 ( );  Iontophoresis         mins 87382;  Orthotic Mgmt/training       mins 37628;  Orthotic check out       mins 93094;  Canalith Repositioning       mins 23381;    Untimed:  Electrical Stimulation:         mins  30141 ( );  Mechanical Traction:         mins  70540;     Timed Treatment:   10   mins   Total Treatment:     45   mins    PT SIGNATURE: Delio Escamilla PT   DATE TREATMENT INITIATED: 5/28/2020    Initial Certification  Certification Period: 8/26/2020  I certify that the therapy services are furnished while this patient is under my care.  The services outlined above are required by this patient, and will be reviewed every 90 days.     PHYSICIAN: John Huizar MD      DATE:     Please sign and return via fax to  .. Thank you, [facility].

## 2020-06-02 ENCOUNTER — TREATMENT (OUTPATIENT)
Dept: PHYSICAL THERAPY | Facility: CLINIC | Age: 35
End: 2020-06-02

## 2020-06-02 DIAGNOSIS — M79.672 CHRONIC FOOT PAIN, LEFT: Primary | ICD-10-CM

## 2020-06-02 DIAGNOSIS — G89.29 CHRONIC FOOT PAIN, LEFT: Primary | ICD-10-CM

## 2020-06-02 DIAGNOSIS — R26.2 DIFFICULTY WALKING: ICD-10-CM

## 2020-06-02 DIAGNOSIS — Z78.9 DIFFICULTY NAVIGATING STAIRS: ICD-10-CM

## 2020-06-02 PROCEDURE — 97112 NEUROMUSCULAR REEDUCATION: CPT | Performed by: PHYSICAL THERAPIST

## 2020-06-02 PROCEDURE — 97110 THERAPEUTIC EXERCISES: CPT | Performed by: PHYSICAL THERAPIST

## 2020-06-02 PROCEDURE — 97760 ORTHOTIC MGMT&TRAING 1ST ENC: CPT | Performed by: PHYSICAL THERAPIST

## 2020-06-02 NOTE — PROGRESS NOTES
Physical Therapy Daily Progress Note    Patient: Niki Nuñez   : 1985  Diagnosis/ICD-10 Code:  Chronic foot pain, left [M79.672, G89.29]  Referring practitioner: John Huizar*  Date of Initial Visit: Type: THERAPY  Noted: 2020  Today's Date: 2020  Patient seen for 2 sessions           Subjective: Hanny reported that the kinesio taping did well for her last visit.    Objective   Forefoot flexibility:  Passive supination, B, approximately 45 degrees    Treatment  1. Impressions for custom made orthotics were taken.  2. She performed 20+ repetitions, seated, of arch raises and towel scrunches, B  3. Kinesio taping of the L forefoot as last visit was performed to support her arch.    Patient education:  Arch raises and towel scrunches were issued to begin her HEP.     Assessment:  She has a flexible forefoot which indicates that she should adapt to a semi-rigid orthotic to help with arch support and improve foot mechanics.  She required cues for exercise technique and tolerated treatment well.     Plan:  Monitor the effect of today's treatment.  Progress exercises with resisted ankle inversion with theraband and begin stretching peroneals in sitting.       Timed:    Manual Therapy:         mins  75406;  Therapeutic Exercise:    15     mins  84790;     Neuromuscular Abhilash:    15    mins  07366;    Therapeutic Activity:          mins  28528;     Gait Training:           mins  14835;     Ultrasound:          mins  60673;    Electrical Stimulation:         mins  10349 ( );  Iontophoresis         mins 50944;  Aquatic Therapy         mins 76922;  Dry Needling                   mins  Orthotic fitting             _15__ mins 42932  Untimed:  Electrical Stimulation:         mins  43376 ( );  Mechanical Traction:         mins  36053;     Timed Treatment:   45   mins   Total Treatment:     45   mins  Delio Escamilla, PT  Physical Therapist

## 2020-06-10 ENCOUNTER — TREATMENT (OUTPATIENT)
Dept: PHYSICAL THERAPY | Facility: CLINIC | Age: 35
End: 2020-06-10

## 2020-06-10 PROCEDURE — 97112 NEUROMUSCULAR REEDUCATION: CPT | Performed by: PHYSICAL THERAPIST

## 2020-06-10 PROCEDURE — 97110 THERAPEUTIC EXERCISES: CPT | Performed by: PHYSICAL THERAPIST

## 2020-06-10 PROCEDURE — 97035 APP MDLTY 1+ULTRASOUND EA 15: CPT | Performed by: PHYSICAL THERAPIST

## 2020-06-10 NOTE — PROGRESS NOTES
Physical Therapy Daily Progress Note    Patient: Niki Nuñez   : 1985  Diagnosis/ICD-10 Code:  No primary diagnosis found.  Referring practitioner: John Huizar*  Date of Initial Visit: No linked episodes  Today's Date: 6/10/2020  Patient seen for Visit count could not be calculated. Make sure you are using a visit which is associated with an episode. sessions           Subjective: Miroslava reported that the tape seemed to help relieve some pain after last session.       Objective     Therapeutic exercise  1. Standing arch raises, 10 x 2, B  2. Seated towel scrunches, 10 x 2, B  3. Seated peroneal stretch, 30s x 3, B  4. Seated ankle inversion, PRE, knees extended, green theraband, x 20, B    US:  To the L lateral foot, both dorsal and plantar surfaces, around the fifth metatarsal, at 1.3 W/cm2 for 8 minutes    Neuromuscular reedcuation:  KT taping, plantarfasciitis II type, as on last vist for the L ankle and foot.    Patient education:  Green theraband issued for ankle inversion PRE for home and peroneal stretch in sitting was added to her HEP as well.    Assessment:  Due to the myotonia in the L LE she has a little more difficulty with the exercises on that side versus her R LE.  She required verbal cues for exercise technique and instruction with the new exercises.  She tolerated treatment with a decrease in pain.    Plan:  Monitor the effect of today's treatment and continue as indicated.       Timed:    Manual Therapy:         mins  23163;  Therapeutic Exercise:    25     mins  47521;     Neuromuscular Abhilash:    12    mins  27789;    Therapeutic Activity:          mins  72303;     Gait Training:           mins  83617;     Ultrasound:     8     mins  46227;    Electrical Stimulation:         mins  45827 ( );  Iontophoresis         mins 20020;  Aquatic Therapy         mins 48138;  Dry Needling                   mins    Untimed:  Electrical Stimulation:         mins  20486 (  );  Mechanical Traction:         mins  11399;     Timed Treatment:   45   mins   Total Treatment:     45   mins  Delio Escamilla, PT  Physical Therapist

## 2020-06-16 ENCOUNTER — TREATMENT (OUTPATIENT)
Dept: PHYSICAL THERAPY | Facility: CLINIC | Age: 35
End: 2020-06-16

## 2020-06-16 DIAGNOSIS — R26.2 DIFFICULTY WALKING: ICD-10-CM

## 2020-06-16 DIAGNOSIS — Z78.9 DIFFICULTY NAVIGATING STAIRS: ICD-10-CM

## 2020-06-16 DIAGNOSIS — G89.29 CHRONIC FOOT PAIN, LEFT: Primary | ICD-10-CM

## 2020-06-16 DIAGNOSIS — M79.672 CHRONIC FOOT PAIN, LEFT: Primary | ICD-10-CM

## 2020-06-16 PROCEDURE — 97112 NEUROMUSCULAR REEDUCATION: CPT | Performed by: PHYSICAL THERAPIST

## 2020-06-16 PROCEDURE — 97110 THERAPEUTIC EXERCISES: CPT | Performed by: PHYSICAL THERAPIST

## 2020-06-16 NOTE — PROGRESS NOTES
Physical Therapy Daily Progress Note    Patient: Niki Nuñez   : 1985  Diagnosis/ICD-10 Code:  Chronic foot pain, left [M79.672, G89.29]  Referring practitioner: John Huizar*  Date of Initial Visit: Type: THERAPY  Noted: 2020  Today's Date: 2020  Patient seen for 3 sessions           Subjective: Hanny reports that the tape stays on for one day and then ravels and she takes it off.  She is not having pain today and generally the L foot is better.      Objective     Therapeutic exercise  1. Standing towel scrunches, x 20, B  2. Standing arch raises, x 20, B  3. Seated peroneal sretch   4. Ankle PRE, green theraband, x 20, B    US:  L lateral foot, @ 1.2 W/cm2, 50% duty cycle x 8 minutes    Neuromuscular reeducation:  KT taping for L arch support as on last visit.    Assessment:  Pain is diminishing.  She requires verbal cues and instructions for exercise technique.    Plan:  Monitor the effect of today's treatment and continue as indicated.         Timed:    Manual Therapy:         mins  21018;  Therapeutic Exercise:    27     mins  56004;     Neuromuscular Abhilash:    10    mins  93381;    Therapeutic Activity:          mins  92896;     Gait Training:           mins  84646;     Ultrasound:     8     mins  69339;    Electrical Stimulation:         mins  91249 ( );  Iontophoresis         mins 08992;  Aquatic Therapy         mins 02055;  Dry Needling                   mins    Untimed:  Electrical Stimulation:         mins  07970 ( );  Mechanical Traction:         mins  79395;     Timed Treatment:   45   mins   Total Treatment:     45   mins  Delio Escamilla, PT  Physical Therapist

## 2020-06-19 ENCOUNTER — TREATMENT (OUTPATIENT)
Dept: PHYSICAL THERAPY | Facility: CLINIC | Age: 35
End: 2020-06-19

## 2020-06-19 DIAGNOSIS — G89.29 CHRONIC FOOT PAIN, LEFT: Primary | ICD-10-CM

## 2020-06-19 DIAGNOSIS — M79.672 CHRONIC FOOT PAIN, LEFT: Primary | ICD-10-CM

## 2020-06-19 DIAGNOSIS — R26.2 DIFFICULTY WALKING: ICD-10-CM

## 2020-06-19 DIAGNOSIS — Z78.9 DIFFICULTY NAVIGATING STAIRS: ICD-10-CM

## 2020-06-19 PROCEDURE — 97760 ORTHOTIC MGMT&TRAING 1ST ENC: CPT | Performed by: PHYSICAL THERAPIST

## 2020-06-19 PROCEDURE — 97035 APP MDLTY 1+ULTRASOUND EA 15: CPT | Performed by: PHYSICAL THERAPIST

## 2020-06-19 PROCEDURE — 97110 THERAPEUTIC EXERCISES: CPT | Performed by: PHYSICAL THERAPIST

## 2020-06-19 NOTE — PROGRESS NOTES
Physical Therapy Daily Progress Note    Patient: Niki Nuñez   : 1985  Diagnosis/ICD-10 Code:  Chronic foot pain, left [M79.672, G89.29]  Referring practitioner: John Huizar*  Date of Initial Visit: Type: THERAPY  Noted: 2020  Today's Date: 2020  Patient seen for 4 sessions           Subjective: Miroslava reported that the tape held on better after last session.  Miroslava reported pain at 2/10 on the dorsum and lateral aspect of the left foot.     Objective     Orthotic check for fit, in sitting, with the orthotics under each foot, she fully plantarflexed her ankles and each orthotic maintained full contact with each arch into peak plantarflexion of the ankles.  The orthotics were then trimmed for her shoes    Therapeutic exercise  1. She ambulated 1/12 of a mile [facility]  2. Runner's stretch, 30s x 3    US to the L lateral aspect of the forefoot and dorsum, 8 minutes, @ 1.3 W/cm2, 50% duty cycle    Patient education:  Issued break in protocol and general care guidelines for the orthotics and advised that she purchase original yoga toes.    Assessment:  Good fit with the orthotics.  She required verbal/tactile cues for performance of the runner's stretch.      Plan:  Monitor the effect of today's treatment and continue as indicated.       Timed:    Manual Therapy:         mins  41319;  Therapeutic Exercise:    22    mins  34306;     Neuromuscular Abhilash:        mins  60238;    Therapeutic Activity:          mins  72673;     Gait Training:           mins  40464;     Ultrasound:     8     mins  95411;    Electrical Stimulation:         mins  76267 ( );  Iontophoresis         mins 35801;  Aquatic Therapy         mins 65519;  Dry Needling                   mins  Orthotic fitting              15 mins  88294  Untimed:  Electrical Stimulation:         mins  02533 ( );  Mechanical Traction:         mins  14898;     Timed Treatment:   45   mins   Total  Treatment:     45   mins  Delio Escamilla, PT  Physical Therapist

## 2020-06-23 ENCOUNTER — TREATMENT (OUTPATIENT)
Dept: PHYSICAL THERAPY | Facility: CLINIC | Age: 35
End: 2020-06-23

## 2020-06-23 DIAGNOSIS — G89.29 CHRONIC FOOT PAIN, LEFT: Primary | ICD-10-CM

## 2020-06-23 DIAGNOSIS — R26.2 DIFFICULTY WALKING: ICD-10-CM

## 2020-06-23 DIAGNOSIS — Z78.9 DIFFICULTY NAVIGATING STAIRS: ICD-10-CM

## 2020-06-23 DIAGNOSIS — M79.672 CHRONIC FOOT PAIN, LEFT: Primary | ICD-10-CM

## 2020-06-23 PROCEDURE — 97110 THERAPEUTIC EXERCISES: CPT | Performed by: PHYSICAL THERAPIST

## 2020-06-23 NOTE — PROGRESS NOTES
Physical Therapy Daily Progress Note    Patient: Niki Nuñez   : 1985  Diagnosis/ICD-10 Code:  Chronic foot pain, left [M79.672, G89.29]  Referring practitioner: John Huizar*  Date of Initial Visit: Type: THERAPY  Noted: 2020  Today's Date: 2020  Patient seen for 5 sessions           Subjective:  Miroslava reports that the orthotics are uncomfortable to wear still, she's up to 3 1/2 hours, but her foot is feeling less pain, 2/10, and function is improving.  She is able to navigate 2 flights of stairs without issue.      Objective     Therapeutic exercise  1. She ambulated one lap around the [facility], 1/12 of a mile  2. Runner's stretch, 30s x 3, B  3. Step up/through, x 10, B  4. Ankle dorsiflexion, seated, x 20, blue theraband, x 20  5. Towel scrunches, seated x 20, B  6. Arch raises, standing, x 20, B  7. Peroneal stretch, seated, 30s x 3, B    Patient education:  Blue theraband issued for ankle dorsiflexion PRE and step up/through added to her HEP.     Assessment:  Miroslava required the use of the treatment table for balance with the step up/through exercise indicating some lack of SLS balance.  I also observed less dorsiflexion during ambulation on the L LE.      Plan:  See again in 1-2 weeks and progress exercises with lateral step downs.       Timed:    Manual Therapy:         mins  93063;  Therapeutic Exercise:    45     mins  49002;     Neuromuscular Abhilash:        mins  72768;    Therapeutic Activity:          mins  50571;     Gait Training:           mins  08895;     Ultrasound:          mins  20470;    Electrical Stimulation:         mins  65472 ( );  Iontophoresis         mins 76790;  Aquatic Therapy         mins 75751;  Dry Needling                   mins    Untimed:  Electrical Stimulation:         mins  16095 ( );  Mechanical Traction:         mins  10212;     Timed Treatment:   45   mins   Total Treatment:     45   mins  Delio Escamilla, PT  Physical  Therapist

## 2020-07-17 ENCOUNTER — TREATMENT (OUTPATIENT)
Dept: PHYSICAL THERAPY | Facility: CLINIC | Age: 35
End: 2020-07-17

## 2020-07-17 DIAGNOSIS — G89.29 CHRONIC FOOT PAIN, LEFT: Primary | ICD-10-CM

## 2020-07-17 DIAGNOSIS — R26.2 DIFFICULTY WALKING: ICD-10-CM

## 2020-07-17 DIAGNOSIS — Z78.9 DIFFICULTY NAVIGATING STAIRS: ICD-10-CM

## 2020-07-17 DIAGNOSIS — M79.672 CHRONIC FOOT PAIN, LEFT: Primary | ICD-10-CM

## 2020-07-17 PROCEDURE — 97110 THERAPEUTIC EXERCISES: CPT | Performed by: PHYSICAL THERAPIST

## 2020-07-17 NOTE — PROGRESS NOTES
Physical Therapy Daily Progress Note    Patient: Niki Nuñez   : 1985  Diagnosis/ICD-10 Code:  Chronic foot pain, left [M79.672, G89.29]  Referring practitioner: John Huizar*  Date of Initial Visit: Type: THERAPY  Noted: 2020  Today's Date: 2020  Patient seen for 6 sessions           Subjective: Miroslava reports that she is wearing the orthotics all day at work.  She notices discomfort when she is out of the orthotics in other shoes.  She feels that the pain in the L foot, that she originally came in for, is reduced by 85%.  She is going up/down stairs without pain now.  She is able to walk her dog for 30 minutes per day.        Objective   Reassessment    PSFS  1, Descending stairs, 10/10  2. 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 Hiking, 8/10  Score 28/30, 7% disability    Ankle MMT  Dorsiflexion, B 5/5  Eversion, B 5/5  Inversion L 4+/5, R 4+ to 5/5  Plantarflexion:  L, able to do 15 heel raises standing on that leg, with table for balance.  Unable to do 1 repetition with the R LE.    Treatment  1. In sitting, blue theraband, L ankle plantarflexion PRE, x 20    Patient education:  Assigned the ankle plantarflexion PRE, blue theraband, issued, for home    Assessment & Plan     Assessment  Assessment details: Niki Nuñez has been seen for 6 physical therapy sessions for chronic left foot pain  Treatment has included therapeutic exercise, manual therapy, neuro-muscular retraining , patient education with home exercise program  and orthotic fabrication . Progress to physical therapy goals is good.  She will benefit from continued skilled physical therapy to address remaining impairments and functional limitations.   Barriers to therapy: myotonia  Prognosis: good    Goals  Plan Goals: STGs to be met in 4 weeks  1. Miroslava notices reduced L foot pain using kinesio taping for arch support. (met)   2. Custom orthotics are discussed and are considered for improved foot mechanics  during weight bearing. (met)  3. She is progressed with specific exercises for ankle inversion strengthening, L knee extensor strengthening and appropriate stretching for the ankle evertors and gastrocnemius musculature. (met)     LTGs to be met in 12 weeks  1. PSFS improves by 15%. (met)  2. Miroslava is independent with a HEP and self care education (ongoing)  3. MMT of the ankle invertors improve by 1/2 muscle grade B.(met)  4. If custom orthotics are made she is wearing them without issue full time. (met)  5. She is able to perform 10 repetitions of heel raises with the R LE islolated. (new)       Plan  Therapy options: will be seen for skilled physical therapy services  Planned therapy interventions: strengthening, home exercise program, functional ROM exercises, flexibility, neuromuscular re-education and balance/weight-bearing training  Frequency: 1 x per 4-6 weeks.  Plan details: Plan to see her in 4-6 weeks to progress PREs for the R gastrocnemius               Timed:    Manual Therapy:         mins  06588;  Therapeutic Exercise:    38     mins  85028;     Neuromuscular Abhilash:        mins  61940;    Therapeutic Activity:          mins  23267;     Gait Training:           mins  27189;     Ultrasound:          mins  82328;    Electrical Stimulation:         mins  31095 ( );  Iontophoresis         mins 80782;  Aquatic Therapy         mins 56399;  Dry Needling                   mins    Untimed:  Electrical Stimulation:         mins  58218 ( );  Mechanical Traction:         mins  64800;     Timed Treatment:   38   mins   Total Treatment:     38   mins  Delio Escamilla, PT  Physical Therapist

## 2020-07-22 ENCOUNTER — OFFICE VISIT (OUTPATIENT)
Dept: CARDIOLOGY | Facility: CLINIC | Age: 35
End: 2020-07-22

## 2020-07-22 VITALS
SYSTOLIC BLOOD PRESSURE: 90 MMHG | DIASTOLIC BLOOD PRESSURE: 60 MMHG | HEIGHT: 69 IN | BODY MASS INDEX: 21.48 KG/M2 | WEIGHT: 145 LBS | HEART RATE: 95 BPM

## 2020-07-22 DIAGNOSIS — G71.11: ICD-10-CM

## 2020-07-22 DIAGNOSIS — I48.0 PAROXYSMAL ATRIAL FIBRILLATION (HCC): Primary | ICD-10-CM

## 2020-07-22 PROCEDURE — 99214 OFFICE O/P EST MOD 30 MIN: CPT | Performed by: INTERNAL MEDICINE

## 2020-07-22 PROCEDURE — 93000 ELECTROCARDIOGRAM COMPLETE: CPT | Performed by: INTERNAL MEDICINE

## 2020-07-22 NOTE — PROGRESS NOTES
Date of Office Visit: 2020  Encounter Provider: Anthony Cortes MD  Place of Service: [facility] CARDIOLOGY  Patient Name: Niki Nuñez  : 1985    Subjective:     Encounter Date:2020      Patient ID: Niki Nuñez is a 34 y.o. female who has a cc of  Myotonic dystrophy and she is in a study at [facility].     No more AF or palp.    She had AF for one hour on a zio.     She thinks she is actually stronger.     The patient had a good year.   No anginal chest pain,   No sig polanco,   No soa,   No fainting,  No orthostasis.   No edema.   Exercise tolerance: she can do a mile plus. And it's getting better.     There have been no hospital admission since the last visit.     There have been no bleeding events.       Past Medical History:   Diagnosis Date   • Cataracts, bilateral    • Classic myotonic dystrophy type 1 (CMS/HCC) 2016    [facility]   • POLANCO (dyspnea on exertion)    • Gastrointestinal dysfunction    • Muscular dystrophy (CMS/HCC)    • URI (upper respiratory infection)        Social History     Socioeconomic History   • Marital status:      Spouse name: Not on file   • Number of children: 0   • Years of education: Not on file   • Highest education level: Not on file   Occupational History   • Occupation: RN   Tobacco Use   • Smoking status: Never Smoker   • Smokeless tobacco: Never Used   • Tobacco comment: caffeine use - 1 cup black tea every am    Substance and Sexual Activity   • Alcohol use: Yes     Alcohol/week: 2.0 standard drinks     Types: 2 Glasses of wine per week     Binge frequency: Weekly   • Drug use: Never   • Sexual activity: Yes     Partners: Male       Review of Systems   Constitution: Negative for fever and night sweats.   HENT: Negative for ear pain and stridor.    Eyes: Negative for discharge and visual halos.   Cardiovascular: Negative for cyanosis.   Respiratory: Negative for hemoptysis and sputum production.   "  Hematologic/Lymphatic: Negative for adenopathy.   Skin: Negative for nail changes and unusual hair distribution.   Musculoskeletal: Negative for gout and joint swelling.   Gastrointestinal: Negative for bowel incontinence and flatus.   Genitourinary: Negative for dysuria and flank pain.   Neurological: Negative for seizures and tremors.   Psychiatric/Behavioral: Negative for altered mental status. The patient is not nervous/anxious.             Objective:     Vitals:    07/22/20 0857   BP: 90/60   BP Location: Left arm   Patient Position: Sitting   Pulse: 95   Weight: 65.8 kg (145 lb)   Height: 175.3 cm (69\")         Physical Exam   Constitutional: She is oriented to person, place, and time.   HENT:   Head: Normocephalic and atraumatic.   Eyes: Right eye exhibits no discharge. Left eye exhibits no discharge.   Neck: No JVD present. No thyromegaly present.   Cardiovascular: Normal rate and regular rhythm. Exam reveals no gallop and no friction rub.   No murmur heard.  Pulmonary/Chest: Effort normal and breath sounds normal. She has no rales.   Abdominal: Soft. Bowel sounds are normal. There is no tenderness.   Musculoskeletal: Normal range of motion. She exhibits no edema or deformity.   Neurological: She is alert and oriented to person, place, and time. She exhibits normal muscle tone.   Skin: Skin is warm and dry. No erythema.   Psychiatric: She has a normal mood and affect. Her behavior is normal. Thought content normal.         ECG 12 Lead  Date/Time: 7/22/2020 9:33 AM  Performed by: Anthony Cortes MD  Authorized by: Anthony Cortes MD   Comparison: compared with previous ECG   Similar to previous ECG  Rhythm: sinus rhythm  Rate: normal  Conduction: conduction normal  ST Segments: ST segments normal  T Waves: T waves normal  QRS axis: normal    Clinical impression: normal ECG            Lab Review:       Assessment:          Diagnosis Plan   1. Paroxysmal atrial fibrillation (CMS/HCC)     2. Classic myotonic " dystrophy type 1 (CMS/HCC)            Plan:     SHe had an ecg in [facility] and the pr was 208 but the hr on it was 75 and all other ecgs show PRs of 180-195.   And today it was 184 so I think she is ok.

## 2020-09-30 RX ORDER — NORGESTREL-ETHINYL ESTRADIOL 0.3-0.03MG
1 TABLET ORAL DAILY
Refills: 0 | OUTPATIENT
Start: 2020-09-30

## 2024-03-11 ENCOUNTER — OFFICE VISIT (OUTPATIENT)
Dept: INTERNAL MEDICINE | Facility: CLINIC | Age: 39
End: 2024-03-11
Payer: COMMERCIAL

## 2024-03-11 VITALS
OXYGEN SATURATION: 98 % | DIASTOLIC BLOOD PRESSURE: 74 MMHG | HEIGHT: 69 IN | SYSTOLIC BLOOD PRESSURE: 120 MMHG | WEIGHT: 167.3 LBS | HEART RATE: 80 BPM | TEMPERATURE: 97.1 F | BODY MASS INDEX: 24.78 KG/M2

## 2024-03-11 DIAGNOSIS — D68.51 FACTOR V LEIDEN: ICD-10-CM

## 2024-03-11 DIAGNOSIS — I48.0 PAROXYSMAL ATRIAL FIBRILLATION: ICD-10-CM

## 2024-03-11 DIAGNOSIS — Z00.00 HEALTHCARE MAINTENANCE: Primary | ICD-10-CM

## 2024-03-11 DIAGNOSIS — G71.11: ICD-10-CM

## 2024-03-11 PROBLEM — T82.898A: Status: RESOLVED | Noted: 2018-02-14 | Resolved: 2024-03-11

## 2024-03-11 PROBLEM — J06.9 ACUTE URI: Status: RESOLVED | Noted: 2017-01-11 | Resolved: 2024-03-11

## 2024-03-11 PROCEDURE — 99385 PREV VISIT NEW AGE 18-39: CPT | Performed by: FAMILY MEDICINE

## 2024-03-11 RX ORDER — DROSPIRENONE 4 MG/1
1 TABLET, FILM COATED ORAL DAILY
COMMUNITY

## 2024-03-11 RX ORDER — MELATONIN 5 MG
TABLET,CHEWABLE ORAL
COMMUNITY

## 2024-03-11 RX ORDER — BUTYROSPERMUM PARKII(SHEA BUTTER), SIMMONDSIA CHINENSIS (JOJOBA) SEED OIL, ALOE BARBADENSIS LEAF EXTRACT .01; 1; 3.5 G/100G; G/100G; G/100G
LIQUID TOPICAL
COMMUNITY

## 2024-03-11 NOTE — PROGRESS NOTES
Subjective   Niki Nuñez is a 38 y.o. female.     Chief Complaint   Patient presents with    Annual Exam    Establish Care         History of Present Illness   Niki Nuñez 38 y.o. female who presents for an Annual Wellness Visit.  she has a history of   Patient Active Problem List   Diagnosis    Healthcare maintenance    Classic myotonic dystrophy type 1    Bone marrow edema    Left calf atrophy    Paroxysmal atrial fibrillation    Factor V Leiden   .  she has been feeling fairly well.   I  reviewed health maintenance with her as part of my preventative care plan.  Recommend regular dental and eye exams  The following portions of the patient's history were reviewed and updated as appropriate: allergies, current medications, past family history, past medical history, past social history, past surgical history, and problem list.    Review of Systems   Constitutional:  Negative for appetite change, fever and unexpected weight change.   HENT:  Negative for ear pain, facial swelling and sore throat.    Eyes:  Negative for pain and visual disturbance.   Respiratory:  Negative for chest tightness, shortness of breath and wheezing.    Cardiovascular:  Negative for chest pain and palpitations.   Gastrointestinal:  Negative for abdominal pain and blood in stool.   Endocrine: Negative.    Genitourinary:  Negative for difficulty urinating and hematuria.   Musculoskeletal:  Negative for joint swelling.   Neurological:  Negative for tremors, seizures and syncope.   Hematological:  Negative for adenopathy.   Psychiatric/Behavioral: Negative.         Objective   Physical Exam  Vitals and nursing note reviewed.   Constitutional:       Appearance: Normal appearance.   HENT:      Head: Normocephalic and atraumatic.      Right Ear: Tympanic membrane, ear canal and external ear normal.      Left Ear: Tympanic membrane, ear canal and external ear normal.      Mouth/Throat:      Pharynx: No posterior oropharyngeal  erythema.   Eyes:      General: No scleral icterus.  Cardiovascular:      Rate and Rhythm: Normal rate. Rhythm irregular.      Pulses: Normal pulses.      Heart sounds: Normal heart sounds.   Pulmonary:      Effort: Pulmonary effort is normal.      Breath sounds: Normal breath sounds.   Abdominal:      Tenderness: There is no right CVA tenderness or left CVA tenderness.   Musculoskeletal:      Cervical back: Neck supple. No tenderness.      Right lower leg: No edema.      Left lower leg: No edema.   Lymphadenopathy:      Cervical: No cervical adenopathy.   Skin:     General: Skin is warm and dry.   Neurological:      General: No focal deficit present.      Mental Status: She is alert and oriented to person, place, and time. Mental status is at baseline.   Psychiatric:         Mood and Affect: Mood normal.         Thought Content: Thought content normal.         Judgment: Judgment normal.         Assessment & Plan   Diagnoses and all orders for this visit:    1. Healthcare maintenance (Primary)  -     Hepatitis C Antibody  -     Comprehensive Metabolic Panel  -     TSH  -     T4, Free  -     Thyroid Peroxidase Antibody  -     Lipid Panel    2. Paroxysmal atrial fibrillation    3. Classic myotonic dystrophy type 1    4. Factor V Leiden    Repeat blood test reveals slightly abnormal TSH  Continue healthy lifestyle calorie appropriate diet regular physical activity  To get provided regarding prevention of serious illness with immunizations  Education provided regarding early detection and screening  Patient's chronic medical problems of atrial fibrillation factor V Leiden and myoclonic dystrophy presently well-controlled  Follow-up ongoing management of chronic problems otherwise preventively annually

## 2024-03-12 PROBLEM — D68.51 FACTOR V LEIDEN: Status: ACTIVE | Noted: 2024-03-12

## 2024-03-12 PROBLEM — S86.312A PERONEAL TENDON TEAR, LEFT, INITIAL ENCOUNTER: Status: RESOLVED | Noted: 2020-01-06 | Resolved: 2024-03-12

## 2024-03-12 PROBLEM — S92.025D: Status: RESOLVED | Noted: 2020-01-06 | Resolved: 2024-03-12

## 2024-03-12 PROBLEM — S92.215A CLOSED NONDISPLACED FRACTURE OF CUBOID BONE OF LEFT FOOT: Status: RESOLVED | Noted: 2020-01-06 | Resolved: 2024-03-12

## 2024-03-12 LAB
ALBUMIN SERPL-MCNC: 4.4 G/DL (ref 3.9–4.9)
ALBUMIN/GLOB SERPL: 2.3 {RATIO} (ref 1.2–2.2)
ALP SERPL-CCNC: 113 IU/L (ref 44–121)
ALT SERPL-CCNC: 50 IU/L (ref 0–32)
AST SERPL-CCNC: 31 IU/L (ref 0–40)
BILIRUB SERPL-MCNC: 0.3 MG/DL (ref 0–1.2)
BUN SERPL-MCNC: 14 MG/DL (ref 6–20)
BUN/CREAT SERPL: 20 (ref 9–23)
CALCIUM SERPL-MCNC: 10 MG/DL (ref 8.7–10.2)
CHLORIDE SERPL-SCNC: 109 MMOL/L (ref 96–106)
CHOLEST SERPL-MCNC: 217 MG/DL (ref 100–199)
CO2 SERPL-SCNC: 24 MMOL/L (ref 20–29)
CREAT SERPL-MCNC: 0.69 MG/DL (ref 0.57–1)
EGFRCR SERPLBLD CKD-EPI 2021: 115 ML/MIN/1.73
GLOBULIN SER CALC-MCNC: 1.9 G/DL (ref 1.5–4.5)
GLUCOSE SERPL-MCNC: 94 MG/DL (ref 70–99)
HCV IGG SERPL QL IA: NON REACTIVE
HDLC SERPL-MCNC: 41 MG/DL
LDLC SERPL CALC-MCNC: 155 MG/DL (ref 0–99)
POTASSIUM SERPL-SCNC: 5.7 MMOL/L (ref 3.5–5.2)
PROT SERPL-MCNC: 6.3 G/DL (ref 6–8.5)
SODIUM SERPL-SCNC: 143 MMOL/L (ref 134–144)
T4 FREE SERPL-MCNC: 1.08 NG/DL (ref 0.82–1.77)
THYROPEROXIDASE AB SERPL-ACNC: <9 IU/ML (ref 0–34)
TRIGL SERPL-MCNC: 117 MG/DL (ref 0–149)
TSH SERPL DL<=0.005 MIU/L-ACNC: 1.34 UIU/ML (ref 0.45–4.5)
VLDLC SERPL CALC-MCNC: 21 MG/DL (ref 5–40)

## 2024-03-15 ENCOUNTER — PATIENT ROUNDING (BHMG ONLY) (OUTPATIENT)
Dept: INTERNAL MEDICINE | Facility: CLINIC | Age: 39
End: 2024-03-15
Payer: COMMERCIAL

## 2024-03-15 NOTE — PROGRESS NOTES
March 15, 2024    Hello, may I speak with Niki Nuñez?    My name is tiara      I am  with [facility]  [address]  [place] [address] [phone].    Before we get started may I verify your date of birth? 1985    I am calling to officially welcome you to our practice and ask about your recent visit. Is this a good time to talk? yes    Tell me about your visit with us. What things went well?  yes       We're always looking for ways to make our patients' experiences even better. Do you have recommendations on ways we may improve?  no    Overall were you satisfied with your first visit to our practice? Yes I was thank you        I appreciate you taking the time to speak with me today. Is there anything else I can do for you? no      Thank you, and have a great day.

## 2024-03-21 DIAGNOSIS — E87.5 SERUM POTASSIUM ELEVATED: ICD-10-CM

## 2024-03-21 DIAGNOSIS — R74.8 ELEVATED LIVER ENZYMES: Primary | ICD-10-CM

## 2024-03-22 LAB
ALBUMIN SERPL-MCNC: 4.5 G/DL (ref 3.9–4.9)
ALBUMIN/GLOB SERPL: 2.4 {RATIO} (ref 1.2–2.2)
ALP SERPL-CCNC: 123 IU/L (ref 44–121)
ALT SERPL-CCNC: 58 IU/L (ref 0–32)
AST SERPL-CCNC: 31 IU/L (ref 0–40)
BILIRUB SERPL-MCNC: 0.3 MG/DL (ref 0–1.2)
BUN SERPL-MCNC: 15 MG/DL (ref 6–20)
BUN/CREAT SERPL: 22 (ref 9–23)
CALCIUM SERPL-MCNC: 10.1 MG/DL (ref 8.7–10.2)
CHLORIDE SERPL-SCNC: 107 MMOL/L (ref 96–106)
CO2 SERPL-SCNC: 22 MMOL/L (ref 20–29)
CREAT SERPL-MCNC: 0.68 MG/DL (ref 0.57–1)
EGFRCR SERPLBLD CKD-EPI 2021: 116 ML/MIN/1.73
GLOBULIN SER CALC-MCNC: 1.9 G/DL (ref 1.5–4.5)
GLUCOSE SERPL-MCNC: 91 MG/DL (ref 70–99)
POTASSIUM SERPL-SCNC: 5.5 MMOL/L (ref 3.5–5.2)
PROT SERPL-MCNC: 6.4 G/DL (ref 6–8.5)
SODIUM SERPL-SCNC: 144 MMOL/L (ref 134–144)

## 2024-06-18 ENCOUNTER — OFFICE VISIT (OUTPATIENT)
Dept: OBSTETRICS AND GYNECOLOGY | Facility: CLINIC | Age: 39
End: 2024-06-18
Payer: COMMERCIAL

## 2024-06-18 VITALS
HEIGHT: 69 IN | DIASTOLIC BLOOD PRESSURE: 72 MMHG | WEIGHT: 162 LBS | BODY MASS INDEX: 23.99 KG/M2 | SYSTOLIC BLOOD PRESSURE: 106 MMHG

## 2024-06-18 DIAGNOSIS — Z30.41 ENCOUNTER FOR SURVEILLANCE OF CONTRACEPTIVE PILLS: Primary | ICD-10-CM

## 2024-06-18 PROCEDURE — 99203 OFFICE O/P NEW LOW 30 MIN: CPT

## 2024-06-18 RX ORDER — ACETAMINOPHEN AND CODEINE PHOSPHATE 120; 12 MG/5ML; MG/5ML
0.35 SOLUTION ORAL
COMMUNITY
Start: 2024-04-18

## 2024-06-18 NOTE — PROGRESS NOTES
"CONTRACEPTION MANAGEMENT VISIT    Chief Complaint   Patient presents with    EH     Pt here for B/C        SUBJECTIVE     Niki is a 38 y.o. No obstetric history on file. who presents today to discuss contraception management. She is a new patient to our practice. LMP: Patient's last menstrual period was 06/16/2024.. Denies history of migraine with aura but she does have a history of DVT. There is no family history of DVT. She is a nonsmoker.      Past Medical History:   Diagnosis Date    Cataracts, bilateral     Classic myotonic dystrophy type 1 11/2016    [facility]    POLANCO (dyspnea on exertion)     DVT (deep venous thrombosis)     Gastrointestinal dysfunction     Melanoma     Muscular dystrophy     URI (upper respiratory infection)         Past Surgical History:   Procedure Laterality Date    CATARACT EXTRACTION, BILATERAL      CHOLECYSTECTOMY  07/2007    LASIK Bilateral     TEMPOROMANDIBULAR JOINT ARTHROPLASTY  2004        Review of Systems   Genitourinary:  Positive for menstrual problem. Negative for decreased urine volume, difficulty urinating, dyspareunia, dysuria, flank pain, frequency, genital sores, hematuria, pelvic pain, urgency, vaginal bleeding, vaginal discharge and vaginal pain.   All other systems reviewed and are negative.      OBJECTIVE    Vitals:    06/18/24 1320   BP: 106/72   Weight: 73.5 kg (162 lb)   Height: 175.3 cm (69.02\")        Physical Exam  Constitutional:       General: She is awake.      Appearance: Normal appearance. She is well-developed and well-groomed.   HENT:      Head: Normocephalic and atraumatic.   Pulmonary:      Effort: Pulmonary effort is normal.   Musculoskeletal:      Cervical back: Normal range of motion.   Neurological:      General: No focal deficit present.      Mental Status: She is alert and oriented to person, place, and time.   Skin:     General: Skin is warm and dry.   Psychiatric:         Mood and Affect: Mood normal.         Behavior: Behavior " normal. Behavior is cooperative.   Vitals reviewed.         ASSESSMENT/PLAN    Diagnoses and all orders for this visit:    1. Encounter for surveillance of contraceptive pills (Primary)  -     POC Pregnancy, Urine    Education given regarding options for contraception, including injectable contraception, IUD placement, implantable contraceptives.  Patient amenable to Nexplanon.  Risks vs benefits discussed.     Return for for Nexplanon placement when delivered.    I spent 15 minutes caring for Niki on this date of service. This time includes time spent by me in the following activities: preparing for the visit, reviewing tests, performing a medically appropriate examination and/or evaluation, counseling and educating the patient/family/caregiver, referring and communicating with other health care professionals, documenting information in the medical record, independently interpreting results and communicating that information with the patient/family/caregiver, care coordination, ordering medications, ordering test(s), ordering procedure(s), obtaining a separately obtained history, and reviewing a separately obtained history    Fariha Garcia CNM  6/18/2024  14:11 EDT

## 2024-07-03 ENCOUNTER — PATIENT ROUNDING (BHMG ONLY) (OUTPATIENT)
Dept: OBSTETRICS AND GYNECOLOGY | Facility: CLINIC | Age: 39
End: 2024-07-03
Payer: COMMERCIAL

## 2024-07-03 ENCOUNTER — PATIENT MESSAGE (OUTPATIENT)
Dept: OBSTETRICS AND GYNECOLOGY | Facility: CLINIC | Age: 39
End: 2024-07-03
Payer: COMMERCIAL

## 2024-07-03 NOTE — PROGRESS NOTES
My chart message has been sent to the patient for PATIENT ROUNDING with [facility].

## 2024-07-22 ENCOUNTER — TELEPHONE (OUTPATIENT)
Dept: OBSTETRICS AND GYNECOLOGY | Facility: CLINIC | Age: 39
End: 2024-07-22
Payer: COMMERCIAL

## 2024-07-22 NOTE — TELEPHONE ENCOUNTER
"  Caller: Niki Nuñez \"Minerva\"    Relationship to patient: Self    Best call back number: [phone]    Chief complaint: IUD INSERTION    Type of visit: IUD/NEXPLANON INSERTION    Requested date: ASAP - PT STARTED HER PERIOD ON FRIDAY EVENING 7/19     If rescheduling, when is the original appointment: N/A     Additional notes:PLEASE CALL PT TO SCHEDULE AS SOON AS POSSIBLE     "

## 2024-07-22 NOTE — TELEPHONE ENCOUNTER
Pt seen for AE on 6/18/24, discussed nexplanon.  Pt started cycle on 7/19, requesting appt for insertion.  Do we have her device?  Ok to schedule this week?    Thanks,   Olivia    Pt # [phone]

## 2024-07-24 ENCOUNTER — OFFICE VISIT (OUTPATIENT)
Dept: OBSTETRICS AND GYNECOLOGY | Facility: CLINIC | Age: 39
End: 2024-07-24
Payer: COMMERCIAL

## 2024-07-24 VITALS
DIASTOLIC BLOOD PRESSURE: 71 MMHG | BODY MASS INDEX: 23.99 KG/M2 | HEIGHT: 69 IN | SYSTOLIC BLOOD PRESSURE: 108 MMHG | WEIGHT: 162 LBS

## 2024-07-24 DIAGNOSIS — Z30.017 NEXPLANON INSERTION: Primary | ICD-10-CM

## 2024-07-24 LAB
B-HCG UR QL: NEGATIVE
EXPIRATION DATE: NORMAL
INTERNAL NEGATIVE CONTROL: NEGATIVE
INTERNAL POSITIVE CONTROL: POSITIVE
Lab: NORMAL

## 2024-07-24 PROCEDURE — 81025 URINE PREGNANCY TEST: CPT

## 2024-07-24 PROCEDURE — 11981 INSERTION DRUG DLVR IMPLANT: CPT

## 2024-07-24 NOTE — PATIENT INSTRUCTIONS
NEXPLANON POST-INSERTION INSTRUCTIONS    CARING FOR YOUR IMPLANT SITE:  Keep your pressure bandage on for 24 hours following the procedure.    Some bruising at the site is normal, but if the site develops redness or heat at the site call the office.Keep the steristrips on the site and do not pull them off. They will stay on for around 5 to 7 days. If after a 14 days they have not come off you may remove them.   To remove the steristrips, use the following instructions:  Wash your hands with soap and water, cleaning under your nails.  To keep the site stable, place your thumb and forefinger on opposite sides of the incision next to the strip you want to remove. Do not pinch the skin as it may cause the wound to open.  With the opposite hand, gently lift and peel one end of the strip, a little at a time.  Slowly pull the strip back horizontally to the skin until it reaches the incision. Do not pull vertically as this increases tension on the skin.  Repeat the process on the opposite end of the strip.  Once both ends have been pulled to the incision, pinch the ends of the strip with your fingers and gently lift.  If any adhesive residue remains on the skin, gently remove it with baby oil, lotion, or medical adhesive remover [facility]. Try not to pick it away with your fingernail, especially if it is close to the wound.  BACKUP BIRTH CONTROL:  Use a back-up method of birth control for 7 days following the insertion of your Nexplanon.   EXERCISE:  Avoid exercise for the first 24 hours following the removal of the Nexplanon, then you can do light exercise 72 hours following insertion. After that it is ok to resume your regular exercise routine.   WHEN TO CALL THE OFFICE OR GO TO THE HOSPITAL:  pain in your lower leg that does not go away  severe chest pain or heaviness in your chest  sudden shortness of breath, sharp chest pain, or coughing blood  symptoms of severe allergic reaction such as  swollen face, tongue, or throat, or have trouble breathing or swallowing  sudden severe headache unlike your usual headaches  weakness or numbness in your arm or leg or trouble speaking  sudden blindness either partial or complete  yellowing of your skin or whites of your eyes especially with fever, tiredness, loss of appetite, dark-colored urine, or light-colored bowel movements  severe pain, swelling or tenderness in the lower stomach (abdomen)  lump in your breast (you should be checking regularly)  problems sleeping  or lack of energy, tiredness, or you feel very sad  heavy menstrual bleeding  heavier than usual  suspect the implant is broken or bent while in your arm due to external forces    Make sure to check your implant monthly to ensure it is still in place.   If you have issues with bleeding while you have your Nexplanon, call the office - we can troubleshoot the bleeding with you!

## 2024-07-24 NOTE — PROGRESS NOTES
"VISIT FOR NEXPLANON INSERTION     Chief Complaint   Patient presents with    Procedure     Here today for Nexplanon insert specialty pharmacy do not bill   NDC 74468-522-41  LOT [id number]  EXP 04/01/2026        SUBJECTIVE:     Niki is a 38 y.o. No obstetric history on file. who presents for Nexplanon device insertion.    Past Medical History:   Diagnosis Date    Cataracts, bilateral     Classic myotonic dystrophy type 1 11/2016    [facility]    POLANCO (dyspnea on exertion)     DVT (deep venous thrombosis)     Gastrointestinal dysfunction     Melanoma     Muscular dystrophy     URI (upper respiratory infection)         Past Surgical History:   Procedure Laterality Date    CATARACT EXTRACTION, BILATERAL      CHOLECYSTECTOMY  07/2007    LASIK Bilateral     TEMPOROMANDIBULAR JOINT ARTHROPLASTY  2004        Review of Systems   Constitutional:  Negative for chills, diaphoresis, fatigue and fever.   Genitourinary:  Negative for decreased urine volume, difficulty urinating, dyspareunia, dysuria, flank pain, frequency, genital sores, hematuria, pelvic pain, urgency, vaginal bleeding, vaginal discharge and vaginal pain.   Hematological:  Negative for adenopathy.   All other systems reviewed and are negative.      OBJECTIVE:     Vitals:    07/24/24 1418   BP: 108/71   Weight: 73.5 kg (162 lb)   Height: 175.3 cm (69.02\")        Physical Exam  Constitutional:       General: She is awake.      Appearance: Normal appearance. She is well-developed and well-groomed.   HENT:      Head: Normocephalic and atraumatic.   Pulmonary:      Effort: Pulmonary effort is normal.   Musculoskeletal:      Cervical back: Normal range of motion.   Neurological:      General: No focal deficit present.      Mental Status: She is alert and oriented to person, place, and time.   Skin:     General: Skin is warm and dry.   Psychiatric:         Mood and Affect: Mood normal.         Behavior: Behavior normal. Behavior is cooperative.   Vitals reviewed. "         ASSESSMENT/PLAN:    Diagnoses and all orders for this visit:    1. Nexplanon insertion (Primary)  -     POC Pregnancy, Urine  -     Etonogestrel (NEXPLANON) 68 MG subdermal implant          NEXPLANON INSERTION    LMP: Patient's last menstrual period was 07/19/2024.   Pregnancy Test: negative  Current Contraception Method: none        PRE PROCEDURE  DIAGNOSIS: Nexplanon insertion   POST PROCEDURE DIAGNOSIS: Nexplanon insertion      PROCEDURE: Nexplanon insertion    ANESTHETIC:4 mL 1% plain Xylocaine     ESTIMATED BLOOD LOSS: NIL     COMPLICATIONS: no complications were noted     COUNSELING  Risks, benefits, alternatives explained. All questions answered. Consents signed.    DEVICE INFORMATION  NDC: 16901-944-90  Lot: [id number]  Expiration Date: 04/01/2026     DESCRIPTION OF PROCEDURE  The area for insertion prepped with betadine in a sterile fashion. About 3 mL of 1% lidocaine. The insertion site was identified approximately 6-8 cm proximal to the left elbow crease in the underside of the upper arm overlying the groove between the biceps and triceps muscles. The skin at the insertion site was stretched by my thumb and index finger. Then inserted the needle tip, bevel side up, at an angle not greater than 20° to the skin surface, just until the skin was penetrated. The applicator was then lowered so that it was parallel to the arm. To minimize the chance of deep incision, the skin was tented upwards with the tip of the needle. The needle was then gently inserted to the full length. Then fixed the device in place on the arm with one hand. I then slowly and carefully retracted the needle cannula back along the length of the device after pushing the release button. The obturator was seen in the device to determine that the nexplanon had been released.     Both the patient and I were easily able to feel the device under the skin. Steri-Strips were applied at the site, and the arm was gently wrapped with gauze. The  patient was instructed to keep bandage on for 12-24 hrs.    There were no complications. The patient tolerated the procedure well.    COUNSELING  She was given a reminder card. She was advised to use condoms as a backup method for at least a week after insertion.  Expectations, warning signs and limitations reviewed.     Return for annual physical.    I spent 15 minutes caring for Niki on this date of service. This time includes time spent by me in the following activities: preparing for the visit, reviewing tests, performing a medically appropriate examination and/or evaluation, counseling and educating the patient/family/caregiver, referring and communicating with other health care professionals, documenting information in the medical record, independently interpreting results and communicating that information with the patient/family/caregiver, care coordination, ordering medications, ordering test(s), ordering procedure(s), obtaining a separately obtained history, and reviewing a separately obtained history    Fariha Garcia CNM  7/27/2024  18:48 EDT

## 2024-08-05 NOTE — PROGRESS NOTES
GYN ANNUAL EXAM     Chief Complaint   Patient presents with    Annual Exam     Ae and pap today       ORIN Prince is a 38 y.o. female who presents for annual well woman exam.     OB History    No obstetric history on file.         SUBJECTIVE    MENSTRUAL & SEXUAL HEALTH  LMP: Patient's last menstrual period was 07/19/2024.  Menses regularity: regular every 28-30 days  Menses length: 5 days  Dysmenorrhea: none  Cyclic symptoms: none  Current contraception: Nexplanon - placed 07/24/2024  History of abnormal pap: no  History of STD: No  Family history of cancer: breast cancer       Family history of breast cancer: yes - maternal grandmother  Performs SBE: performs monthly.  Incontinence: no  Dyspareunia: no    Past Medical History:   Diagnosis Date    Cataracts, bilateral     Classic myotonic dystrophy type 1 11/2016    [facility] Testing    POLANCO (dyspnea on exertion)     DVT (deep venous thrombosis)     Gastrointestinal dysfunction     Melanoma     Muscular dystrophy     URI (upper respiratory infection)        Past Surgical History:   Procedure Laterality Date    CATARACT EXTRACTION, BILATERAL      CHOLECYSTECTOMY  07/2007    LASIK Bilateral     TEMPOROMANDIBULAR JOINT ARTHROPLASTY  2004         Current Outpatient Medications:     ASPIRIN 81 PO, Take 1 tablet by mouth Daily., Disp: , Rfl:     Cholecalciferol (D3 2000) 50 MCG (2000 UT) capsule, , Disp: , Rfl:     CRANBERRY PO, Take 650 mg by mouth., Disp: , Rfl:     hydrochlorothiazide (HYDRODIURIL) 25 MG tablet, , Disp: , Rfl: 0    Ibuprofen (ADVIL) 200 MG capsule, Take 2 tablets by mouth daily as needed., Disp: , Rfl:     Melatonin 5 MG chewable tablet, , Disp: , Rfl:     metoprolol tartrate (LOPRESSOR) 25 MG tablet, As needed., Disp: , Rfl:     Probiotic Product (PROBIOTIC PO), Take  by mouth Daily., Disp: , Rfl:     norethindrone (MICRONOR) 0.35 MG tablet, Take 1 tablet by mouth., Disp: , Rfl:     No Known Allergies    Social History     Tobacco Use    Smoking  "status: Never    Smokeless tobacco: Never    Tobacco comments:     caffeine use - 1 cup black tea every am    Vaping Use    Vaping status: Never Used   Substance Use Topics    Alcohol use: Yes     Alcohol/week: 2.0 standard drinks of alcohol     Types: 2 Glasses of wine per week     Comment: weekly    Drug use: Never       Family History   Problem Relation Age of Onset    Nephrolithiasis Mother     Glaucoma Father     Autism Brother     Breast cancer Maternal Grandmother     Rheum arthritis Maternal Grandmother     Hypertension Maternal Grandfather     Diabetes Maternal Grandfather     Macular degeneration Maternal Grandfather     Heart attack Paternal Grandfather     Heart disease Paternal Grandfather         CABG x 3    Glaucoma Paternal Grandfather     Breast cancer Other     Brain cancer Other     Cancer Other     Cancer Cousin     Alcohol abuse Paternal Uncle     Sleep apnea Other     Other Other         myotonic dystrophy type 1       Review of Systems   Constitutional:  Negative for fatigue, unexpected weight gain and unexpected weight loss.   Gastrointestinal:  Negative for abdominal pain.   Genitourinary:  Negative for decreased libido, difficulty urinating, dyspareunia, dysuria, pelvic pain, pelvic pressure, urgency, urinary incontinence and vaginal discharge.   All other systems reviewed and are negative.      OBJECTIVE    /72   Ht 175.3 cm (69.02\")   Wt 71.2 kg (157 lb)   LMP 07/19/2024   BMI 23.17 kg/m²     Physical Exam  Constitutional:       General: She is awake. She is not in acute distress.     Appearance: Normal appearance. She is well-developed and well-groomed. She is not ill-appearing.   Genitourinary:      Vulva, bladder and urethral meatus normal.      Right Labia: No rash, tenderness, lesions or skin changes.     Left Labia: No tenderness, lesions, skin changes or rash.     No labial fusion noted.      No inguinal adenopathy present in the right or left side.     No vaginal " discharge, erythema, tenderness, bleeding, ulceration or granulation tissue.      No vaginal prolapse present.     No vaginal atrophy present.     No cervical discharge, friability, lesion, polyp, eversion or elongation.      Uterus is not enlarged, tender or prolapsed.      Pelvic exam was performed with patient in the lithotomy position.   Breasts:     Breasts are symmetrical.      Breasts are soft.     Right: Normal.      Left: Normal.   HENT:      Head: Normocephalic and atraumatic.   Eyes:      General: No scleral icterus.     Conjunctiva/sclera: Conjunctivae normal.   Cardiovascular:      Rate and Rhythm: Normal rate and regular rhythm.      Heart sounds: Normal heart sounds.   Pulmonary:      Effort: Pulmonary effort is normal.      Breath sounds: Normal breath sounds.   Abdominal:      Palpations: Abdomen is soft.      Hernia: There is no hernia in the left inguinal area or right inguinal area.   Musculoskeletal:         General: Normal range of motion.      Cervical back: Normal range of motion.   Lymphadenopathy:      Lower Body: No right inguinal adenopathy. No left inguinal adenopathy.   Neurological:      Mental Status: She is alert.   Skin:     General: Skin is warm and dry.      Coloration: Skin is not jaundiced or pale.   Psychiatric:         Behavior: Behavior normal. Behavior is cooperative.   Vitals reviewed.         ASSESSMENT     Diagnoses and all orders for this visit:    1. Encounter for gynecological examination without abnormal finding (Primary)  -     IGP, Apt HPV,rfx 16 / 18,45       PLAN   WELL WOMAN EXAM: Pap smear collected today. Recommend MVI daily.    CONTRACEPTION: Nexplanon. Due to be removed/replaced by 07/24/2027.   SMOKING STATUS: non smoker.  BREAST HEALTH: Encouraged annual mammogram screening starting at age 40. Instructed on how to perform SBE. Encouraged breast health self awareness.  EXERCISE: Encouraged 150 minutes of exercise per week if not medially contraindicated.    BMI: Body mass index is 23.17 kg/m².     Return in about 1 year (around 8/6/2025) for annual exam or as needed before.    I spent 30 minutes caring for Niki on this date of service. This time includes time spent by me in the following activities: preparing for the visit, reviewing tests, performing a medically appropriate examination and/or evaluation, counseling and educating the patient/family/caregiver, referring and communicating with other health care professionals, documenting information in the medical record, independently interpreting results and communicating that information with the patient/family/caregiver, care coordination, ordering medications, ordering test(s), ordering procedure(s), obtaining a separately obtained history, and reviewing a separately obtained history.    Fariha Garcia CNM  8/6/2024  14:25 EDT

## 2024-08-06 ENCOUNTER — OFFICE VISIT (OUTPATIENT)
Dept: OBSTETRICS AND GYNECOLOGY | Facility: CLINIC | Age: 39
End: 2024-08-06
Payer: COMMERCIAL

## 2024-08-06 VITALS
BODY MASS INDEX: 23.25 KG/M2 | WEIGHT: 157 LBS | DIASTOLIC BLOOD PRESSURE: 72 MMHG | SYSTOLIC BLOOD PRESSURE: 104 MMHG | HEIGHT: 69 IN

## 2024-08-06 DIAGNOSIS — Z01.419 ENCOUNTER FOR GYNECOLOGICAL EXAMINATION WITHOUT ABNORMAL FINDING: Primary | ICD-10-CM

## 2024-08-06 PROCEDURE — 99395 PREV VISIT EST AGE 18-39: CPT

## 2024-08-10 LAB
CYTOLOGIST CVX/VAG CYTO: NORMAL
CYTOLOGY CVX/VAG DOC CYTO: NORMAL
CYTOLOGY CVX/VAG DOC THIN PREP: NORMAL
DX ICD CODE: NORMAL
HPV I/H RISK 4 DNA CVX QL PROBE+SIG AMP: NEGATIVE
Lab: NORMAL
OTHER STN SPEC: NORMAL
STAT OF ADQ CVX/VAG CYTO-IMP: NORMAL

## 2024-09-03 ENCOUNTER — TELEPHONE (OUTPATIENT)
Dept: OBSTETRICS AND GYNECOLOGY | Facility: CLINIC | Age: 39
End: 2024-09-03
Payer: COMMERCIAL

## 2024-09-03 RX ORDER — METRONIDAZOLE 500 MG/1
500 TABLET ORAL 2 TIMES DAILY
Qty: 14 TABLET | Refills: 0 | Status: SHIPPED | OUTPATIENT
Start: 2024-09-03 | End: 2024-09-10

## 2024-09-03 NOTE — TELEPHONE ENCOUNTER
Pt states she currently has BV and is requesting prescription to treat.     Pharmacy confirmed -   [id number] - [facility] [address] AT [facility] OF [address] - [phone]  - [phone]      Thank you!

## 2025-07-08 ENCOUNTER — OFFICE VISIT (OUTPATIENT)
Dept: INTERNAL MEDICINE | Facility: CLINIC | Age: 40
End: 2025-07-08
Payer: COMMERCIAL

## 2025-07-08 VITALS
DIASTOLIC BLOOD PRESSURE: 68 MMHG | OXYGEN SATURATION: 98 % | SYSTOLIC BLOOD PRESSURE: 102 MMHG | HEART RATE: 80 BPM | HEIGHT: 69 IN | TEMPERATURE: 98 F | BODY MASS INDEX: 24.48 KG/M2 | WEIGHT: 165.3 LBS

## 2025-07-08 DIAGNOSIS — Z00.00 HEALTHCARE MAINTENANCE: Primary | ICD-10-CM

## 2025-07-08 DIAGNOSIS — G71.11: ICD-10-CM

## 2025-07-08 DIAGNOSIS — K59.1 FUNCTIONAL DIARRHEA: ICD-10-CM

## 2025-07-08 PROCEDURE — 99214 OFFICE O/P EST MOD 30 MIN: CPT | Performed by: FAMILY MEDICINE

## 2025-07-08 RX ORDER — DIPHENOXYLATE HYDROCHLORIDE AND ATROPINE SULFATE 2.5; .025 MG/1; MG/1
1 TABLET ORAL 3 TIMES DAILY PRN
Qty: 90 TABLET | Refills: 0 | Status: SHIPPED | OUTPATIENT
Start: 2025-07-08

## 2025-07-08 NOTE — PROGRESS NOTES
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  She recently had loss of her father she is trying to cope and has family support  Objective   Vital Signs:  /68   Pulse 80   Temp 98 °F (36.7 °C)   Ht 175.3 cm (69\")   Wt 75 kg (165 lb 4.8 oz)   SpO2 98%   BMI 24.41 kg/m²   Estimated body mass index is 24.41 kg/m² as calculated from the following:    Height as of this encounter: 175.3 cm (69\").    Weight as of this encounter: 75 kg (165 lb 4.8 oz).    BMI is within normal parameters. No other follow-up for BMI required.      Physical Exam  Vitals and nursing note reviewed.   Constitutional:       Appearance: Normal appearance.   HENT:      Head: Normocephalic and atraumatic.   Cardiovascular:      Rate and Rhythm: Normal rate and regular rhythm.      Pulses: Normal pulses.      Heart sounds: Normal heart sounds.   Pulmonary:      Effort: Pulmonary effort is normal.      Breath sounds: Normal breath sounds.   Musculoskeletal:      Right lower leg: No edema.      Left lower leg: No edema.   Neurological:      Mental Status: She is alert.   Psychiatric:         Mood and Affect: Mood normal.         Behavior: Behavior normal.         Thought Content: Thought content normal.         Judgment: Judgment normal.        Result Review :                Assessment and Plan   Diagnoses and all orders for this visit:    1. Healthcare maintenance (Primary)  -     Hemoglobin A1c  -     Comprehensive Metabolic Panel  -     Lipid Panel    2. Functional diarrhea  -     diphenoxylate-atropine (Lomotil) 2.5-0.025 MG per tablet; Take 1 tablet by mouth 3 (Three) Times a Day As Needed for Diarrhea.  Dispense: 90 tablet; " Refill: 0    3. Classic myotonic dystrophy type 1             Follow Up   Return in about 1 month (around 8/8/2025), or if symptoms worsen or fail to improve, for Recheck.  Patient was given instructions and counseling regarding her condition or for health maintenance advice. Please see specific information pulled into the AVS if appropriate.

## 2025-07-08 NOTE — PROGRESS NOTES
Subjective   Niki Nuñez is a 39 y.o. female.     Chief Complaint   Patient presents with    Gynecologic Exam    Med Refill    grief         History of Present Illness   Niki Nuñez 39 y.o. female who presents for an Annual Wellness Visit.  she has a history of   Patient Active Problem List   Diagnosis    Healthcare maintenance    Classic myotonic dystrophy type 1    Bone marrow edema    Left calf atrophy    Paroxysmal atrial fibrillation    Factor V Leiden   .  she has been feeling well.   I  reviewed health maintenance with her as part of my preventative care plan.  The following portions of the patient's history were reviewed and updated as appropriate: allergies, current medications, past family history, past medical history, past social history, past surgical history, and problem list.    Review of Systems    Objective   Physical Exam    Assessment & Plan

## 2025-07-09 LAB
ALBUMIN SERPL-MCNC: 4.6 G/DL (ref 3.9–4.9)
ALP SERPL-CCNC: 128 IU/L (ref 44–121)
ALT SERPL-CCNC: 62 IU/L (ref 0–32)
AST SERPL-CCNC: 25 IU/L (ref 0–40)
BILIRUB SERPL-MCNC: 0.3 MG/DL (ref 0–1.2)
BUN SERPL-MCNC: 14 MG/DL (ref 6–20)
BUN/CREAT SERPL: 25 (ref 9–23)
CALCIUM SERPL-MCNC: 10.3 MG/DL (ref 8.7–10.2)
CHLORIDE SERPL-SCNC: 102 MMOL/L (ref 96–106)
CHOLEST SERPL-MCNC: 220 MG/DL (ref 100–199)
CO2 SERPL-SCNC: 23 MMOL/L (ref 20–29)
CREAT SERPL-MCNC: 0.55 MG/DL (ref 0.57–1)
EGFRCR SERPLBLD CKD-EPI 2021: 121 ML/MIN/1.73
GLOBULIN SER CALC-MCNC: 1.6 G/DL (ref 1.5–4.5)
GLUCOSE SERPL-MCNC: 78 MG/DL (ref 70–99)
HBA1C MFR BLD: 5.2 % (ref 4.8–5.6)
HDLC SERPL-MCNC: 46 MG/DL
LDLC SERPL CALC-MCNC: 142 MG/DL (ref 0–99)
POTASSIUM SERPL-SCNC: 4.5 MMOL/L (ref 3.5–5.2)
PROT SERPL-MCNC: 6.2 G/DL (ref 6–8.5)
SODIUM SERPL-SCNC: 140 MMOL/L (ref 134–144)
TRIGL SERPL-MCNC: 180 MG/DL (ref 0–149)
VLDLC SERPL CALC-MCNC: 32 MG/DL (ref 5–40)

## 2025-07-15 NOTE — PROGRESS NOTES
"GYN EXAM    Chief Complaint   Patient presents with    Follow-up     Here today for possible B/V        SUBJECTIVE     Niki is a 39 y.o. No obstetric history on file. who presents with complaints of vaginal discharge and odor.     Discharge: endorses discharge  Itching: endorses  Vaginal odor: endorses  Pelvic pain: denies  Dysuria: denies  Urine odor: denies  New sexual partners: denies  Change in soaps or detergents: endorses recent trip out of state  Douching: denies  History of recurrent vaginitis: denies    Past Medical History:   Diagnosis Date    Bleeding disorder     Factor V Leiden    Cataracts, bilateral     Cholelithiasis Summer 2007    Classic myotonic dystrophy type 1 11/2016    [facility]    POLANCO (dyspnea on exertion)     DVT (deep venous thrombosis)     Gastrointestinal dysfunction     Hyperlipidemia     Melanoma     Muscular dystrophy type 1     URI (upper respiratory infection)         Past Surgical History:   Procedure Laterality Date    CATARACT EXTRACTION      CATARACT EXTRACTION, BILATERAL      CHOLECYSTECTOMY  07/2007    COLONOSCOPY      LAPAROSCOPIC CHOLECYSTECTOMY      LASIK Bilateral     TEMPOROMANDIBULAR JOINT ARTHROPLASTY  2004    WISDOM TOOTH EXTRACTION          Review of Systems   Constitutional:  Negative for chills, diaphoresis, fatigue and fever.   Genitourinary:  Positive for vaginal discharge. Negative for decreased urine volume, difficulty urinating, dyspareunia, dysuria, flank pain, frequency, genital sores, hematuria, pelvic pain, urgency and vaginal pain.   Hematological:  Negative for adenopathy.   All other systems reviewed and are negative.      OBJECTIVE     Vitals:    07/16/25 1411   BP: 129/86   Weight: 73.5 kg (162 lb)   Height: 175.3 cm (69.02\")        Physical Exam  Constitutional:       General: She is awake.      Appearance: Normal appearance. She is well-developed and well-groomed.   Genitourinary:      No lesions in the vagina.      Vaginal discharge " present.      No vaginal erythema, tenderness or ulceration.      Cervical discharge present.      No cervical friability or lesion.   HENT:      Head: Normocephalic and atraumatic.   Pulmonary:      Effort: Pulmonary effort is normal.   Musculoskeletal:      Cervical back: Normal range of motion.   Neurological:      General: No focal deficit present.      Mental Status: She is alert and oriented to person, place, and time.   Skin:     General: Skin is warm and dry.   Psychiatric:         Mood and Affect: Mood normal.         Behavior: Behavior normal. Behavior is cooperative.   Vitals reviewed.         ASSESSMENT/PLAN    Diagnoses and all orders for this visit:    1. Vaginal discharge (Primary)  -     NuSwab VG+ - Swab, Vagina  -     metroNIDAZOLE (FLAGYL) 500 MG tablet; Take 1 tablet by mouth 2 (Two) Times a Day for 7 days.  Dispense: 14 tablet; Refill: 0    2. Vaginal odor  -     NuSwab VG+ - Swab, Vagina  -     metroNIDAZOLE (FLAGYL) 500 MG tablet; Take 1 tablet by mouth 2 (Two) Times a Day for 7 days.  Dispense: 14 tablet; Refill: 0    Vaginal cultures obtained and sent. Will notify patient of results when available and treat if indicated.   Encouraged condoms with intercourse, cotton only underwear, mild or no soaps, avoidance of douching or ca steaming.   Encouraged adding probiotic daily or vaginal boric acid suppositories.     Return if symptoms worsen or fail to improve.    I spent 30 minutes caring for Niki on this date of service. This time includes time spent by me in the following activities: preparing for the visit, reviewing tests, performing a medically appropriate examination and/or evaluation, counseling and educating the patient/family/caregiver, referring and communicating with other health care professionals, documenting information in the medical record, independently interpreting results and communicating that information with the patient/family/caregiver, care coordination, ordering  medications, ordering test(s), ordering procedure(s), obtaining a separately obtained history, and reviewing a separately obtained history.    Fariha Garcia CNM  7/20/2025  19:47 EDT

## 2025-07-16 ENCOUNTER — OFFICE VISIT (OUTPATIENT)
Dept: OBSTETRICS AND GYNECOLOGY | Facility: CLINIC | Age: 40
End: 2025-07-16
Payer: COMMERCIAL

## 2025-07-16 VITALS
SYSTOLIC BLOOD PRESSURE: 129 MMHG | WEIGHT: 162 LBS | BODY MASS INDEX: 23.99 KG/M2 | DIASTOLIC BLOOD PRESSURE: 86 MMHG | HEIGHT: 69 IN

## 2025-07-16 DIAGNOSIS — N89.8 VAGINAL ODOR: ICD-10-CM

## 2025-07-16 DIAGNOSIS — N89.8 VAGINAL DISCHARGE: Primary | ICD-10-CM

## 2025-07-16 RX ORDER — METRONIDAZOLE 500 MG/1
500 TABLET ORAL 2 TIMES DAILY
Qty: 14 TABLET | Refills: 0 | Status: SHIPPED | OUTPATIENT
Start: 2025-07-16 | End: 2025-07-23

## 2025-07-18 LAB
A VAGINAE DNA VAG QL NAA+PROBE: NORMAL SCORE
BVAB2 DNA VAG QL NAA+PROBE: NORMAL SCORE
C ALBICANS DNA VAG QL NAA+PROBE: NEGATIVE
C GLABRATA DNA VAG QL NAA+PROBE: NEGATIVE
C TRACH DNA SPEC QL NAA+PROBE: NEGATIVE
MEGA1 DNA VAG QL NAA+PROBE: NORMAL SCORE
N GONORRHOEA DNA VAG QL NAA+PROBE: NEGATIVE
T VAGINALIS DNA VAG QL NAA+PROBE: NEGATIVE

## 2025-08-08 ENCOUNTER — OFFICE VISIT (OUTPATIENT)
Dept: OBSTETRICS AND GYNECOLOGY | Facility: CLINIC | Age: 40
End: 2025-08-08
Payer: COMMERCIAL

## 2025-08-08 VITALS
BODY MASS INDEX: 23.99 KG/M2 | DIASTOLIC BLOOD PRESSURE: 72 MMHG | WEIGHT: 162 LBS | SYSTOLIC BLOOD PRESSURE: 103 MMHG | HEIGHT: 69 IN

## 2025-08-08 DIAGNOSIS — Z01.419 ENCOUNTER FOR GYNECOLOGICAL EXAMINATION WITHOUT ABNORMAL FINDING: Primary | ICD-10-CM

## 2025-08-08 DIAGNOSIS — Z30.46 ENCOUNTER FOR SURVEILLANCE OF IMPLANTABLE SUBDERMAL CONTRACEPTIVE: ICD-10-CM

## 2025-08-08 PROCEDURE — 99395 PREV VISIT EST AGE 18-39: CPT

## 2025-08-12 LAB
CYTOLOGIST CVX/VAG CYTO: NORMAL
CYTOLOGY CVX/VAG DOC CYTO: NORMAL
CYTOLOGY CVX/VAG DOC THIN PREP: NORMAL
DX ICD CODE: NORMAL
HPV I/H RISK 4 DNA CVX QL PROBE+SIG AMP: NEGATIVE
OTHER STN SPEC: NORMAL
SERVICE CMNT-IMP: NORMAL
STAT OF ADQ CVX/VAG CYTO-IMP: NORMAL